# Patient Record
Sex: MALE | Race: OTHER | Employment: FULL TIME | URBAN - METROPOLITAN AREA
[De-identification: names, ages, dates, MRNs, and addresses within clinical notes are randomized per-mention and may not be internally consistent; named-entity substitution may affect disease eponyms.]

---

## 2019-08-15 ENCOUNTER — OFFICE VISIT (OUTPATIENT)
Dept: FAMILY MEDICINE CLINIC | Age: 34
End: 2019-08-15
Payer: COMMERCIAL

## 2019-08-15 VITALS
HEIGHT: 69 IN | BODY MASS INDEX: 24.44 KG/M2 | SYSTOLIC BLOOD PRESSURE: 126 MMHG | WEIGHT: 165 LBS | HEART RATE: 75 BPM | DIASTOLIC BLOOD PRESSURE: 84 MMHG

## 2019-08-15 DIAGNOSIS — Z13.220 SCREENING FOR LIPID DISORDERS: ICD-10-CM

## 2019-08-15 DIAGNOSIS — K59.04 CHRONIC IDIOPATHIC CONSTIPATION: ICD-10-CM

## 2019-08-15 DIAGNOSIS — Z13.21 ENCOUNTER FOR VITAMIN DEFICIENCY SCREENING: ICD-10-CM

## 2019-08-15 DIAGNOSIS — Z13.1 SCREENING FOR DIABETES MELLITUS: ICD-10-CM

## 2019-08-15 DIAGNOSIS — Z76.89 ESTABLISHING CARE WITH NEW DOCTOR, ENCOUNTER FOR: ICD-10-CM

## 2019-08-15 DIAGNOSIS — D12.4 ADENOMATOUS POLYP OF DESCENDING COLON: Primary | ICD-10-CM

## 2019-08-15 DIAGNOSIS — Z13.29 SCREENING FOR ENDOCRINE DISORDER: ICD-10-CM

## 2019-08-15 PROCEDURE — 81003 URINALYSIS AUTO W/O SCOPE: CPT | Performed by: FAMILY MEDICINE

## 2019-08-15 PROCEDURE — 99203 OFFICE O/P NEW LOW 30 MIN: CPT | Performed by: FAMILY MEDICINE

## 2019-08-15 ASSESSMENT — PATIENT HEALTH QUESTIONNAIRE - PHQ9
2. FEELING DOWN, DEPRESSED OR HOPELESS: 0
SUM OF ALL RESPONSES TO PHQ QUESTIONS 1-9: 0
SUM OF ALL RESPONSES TO PHQ9 QUESTIONS 1 & 2: 0
1. LITTLE INTEREST OR PLEASURE IN DOING THINGS: 0
SUM OF ALL RESPONSES TO PHQ QUESTIONS 1-9: 0

## 2019-08-15 ASSESSMENT — ENCOUNTER SYMPTOMS
NAUSEA: 0
COLOR CHANGE: 0
TROUBLE SWALLOWING: 0
EYE PAIN: 0
RESPIRATORY NEGATIVE: 1
APNEA: 0
ABDOMINAL PAIN: 0
VOMITING: 0
DIARRHEA: 0
SHORTNESS OF BREATH: 0
COUGH: 0
SORE THROAT: 0
CHEST TIGHTNESS: 0

## 2019-08-15 NOTE — PROGRESS NOTES
Visit Information    Have you changed or started any medications since your last visit including any over-the-counter medicines, vitamins, or herbal medicines? no   Have you stopped taking any of your medications? Is so, why? -  no  Are you having any side effects from any of your medications? - no    Have you seen any other physician or provider since your last visit?  no   Have you had any other diagnostic tests since your last visit?  no   Have you been seen in the emergency room and/or had an admission in a hospital since we last saw you?  no   Have you had your routine dental cleaning in the past 6 months?  no     Do you have an active MyChart account? If no, what is the barrier?   No:     Patient Care Team:  Marleny Munoz MD as PCP - General    Medical History Review  Past Medical, Family, and Social History reviewed and does not contribute to the patient presenting condition    Health Maintenance   Topic Date Due    Varicella Vaccine (1 of 2 - 13+ 2-dose series) 05/18/2000    HIV screen  05/18/2002    DTaP/Tdap/Td vaccine (1 - Tdap) 05/18/2006    Flu vaccine (1) 09/01/2019    Pneumococcal 0-64 years Vaccine  Aged Out

## 2019-08-16 ASSESSMENT — ENCOUNTER SYMPTOMS: CONSTIPATION: 1

## 2019-12-09 PROBLEM — D12.4 ADENOMATOUS POLYP OF DESCENDING COLON: Status: ACTIVE | Noted: 2019-12-09

## 2019-12-09 PROBLEM — K59.04 CHRONIC IDIOPATHIC CONSTIPATION: Status: ACTIVE | Noted: 2019-12-09

## 2020-10-15 ENCOUNTER — OFFICE VISIT (OUTPATIENT)
Dept: FAMILY MEDICINE CLINIC | Age: 35
End: 2020-10-15
Payer: COMMERCIAL

## 2020-10-15 ENCOUNTER — HOSPITAL ENCOUNTER (OUTPATIENT)
Age: 35
Setting detail: SPECIMEN
Discharge: HOME | End: 2020-10-15
Payer: COMMERCIAL

## 2020-10-15 VITALS
DIASTOLIC BLOOD PRESSURE: 88 MMHG | SYSTOLIC BLOOD PRESSURE: 136 MMHG | OXYGEN SATURATION: 99 % | WEIGHT: 165.2 LBS | TEMPERATURE: 97.9 F | BODY MASS INDEX: 25.93 KG/M2 | HEART RATE: 64 BPM | HEIGHT: 67 IN

## 2020-10-15 PROBLEM — M25.551 HIP PAIN, ACUTE, RIGHT: Status: ACTIVE | Noted: 2020-10-15

## 2020-10-15 LAB
ABSOLUTE EOS #: 0.2 K/UL (ref 0–0.4)
ABSOLUTE IMMATURE GRANULOCYTE: ABNORMAL K/UL (ref 0–0.3)
ABSOLUTE LYMPH #: 2.6 K/UL (ref 1–4.8)
ABSOLUTE MONO #: 0.6 K/UL (ref 0.1–1.3)
ALBUMIN SERPL-MCNC: 4.9 G/DL (ref 3.5–5.2)
ALBUMIN/GLOBULIN RATIO: ABNORMAL (ref 1–2.5)
ALP BLD-CCNC: 134 U/L (ref 40–129)
ALT SERPL-CCNC: 28 U/L (ref 5–41)
ANION GAP SERPL CALCULATED.3IONS-SCNC: 11 MMOL/L (ref 9–17)
AST SERPL-CCNC: 25 U/L
BASOPHILS # BLD: 1 % (ref 0–2)
BASOPHILS ABSOLUTE: 0 K/UL (ref 0–0.2)
BILIRUB SERPL-MCNC: 0.33 MG/DL (ref 0.3–1.2)
BILIRUBIN, POC: NEGATIVE
BLOOD URINE, POC: NEGATIVE
BUN BLDV-MCNC: 11 MG/DL (ref 6–20)
BUN/CREAT BLD: ABNORMAL (ref 9–20)
CALCIUM SERPL-MCNC: 9.8 MG/DL (ref 8.6–10.4)
CHLORIDE BLD-SCNC: 103 MMOL/L (ref 98–107)
CHOLESTEROL, FASTING: 207 MG/DL
CHOLESTEROL/HDL RATIO: 4.7
CLARITY, POC: CLEAR
CO2: 27 MMOL/L (ref 20–31)
COLOR, POC: YELLOW
CREAT SERPL-MCNC: 1.02 MG/DL (ref 0.7–1.2)
DIFFERENTIAL TYPE: ABNORMAL
EOSINOPHILS RELATIVE PERCENT: 2 % (ref 0–4)
ESTIMATED AVERAGE GLUCOSE: 94 MG/DL
GFR AFRICAN AMERICAN: >60 ML/MIN
GFR NON-AFRICAN AMERICAN: >60 ML/MIN
GFR SERPL CREATININE-BSD FRML MDRD: ABNORMAL ML/MIN/{1.73_M2}
GFR SERPL CREATININE-BSD FRML MDRD: ABNORMAL ML/MIN/{1.73_M2}
GLUCOSE FASTING: 106 MG/DL (ref 70–99)
GLUCOSE URINE, POC: NEGATIVE
HBA1C MFR BLD: 4.9 % (ref 4–6)
HCT VFR BLD CALC: 47.3 % (ref 41–53)
HDLC SERPL-MCNC: 44 MG/DL
HEMOGLOBIN: 16.5 G/DL (ref 13.5–17.5)
HIV AG/AB: NONREACTIVE
IMMATURE GRANULOCYTES: ABNORMAL %
KETONES, POC: NEGATIVE
LDL CHOLESTEROL: 131 MG/DL (ref 0–130)
LEUKOCYTE EST, POC: NEGATIVE
LYMPHOCYTES # BLD: 39 % (ref 24–44)
MCH RBC QN AUTO: 31.5 PG (ref 26–34)
MCHC RBC AUTO-ENTMCNC: 34.9 G/DL (ref 31–37)
MCV RBC AUTO: 90.4 FL (ref 80–100)
MONOCYTES # BLD: 10 % (ref 1–7)
NITRITE, POC: NEGATIVE
NRBC AUTOMATED: ABNORMAL PER 100 WBC
PDW BLD-RTO: 13.6 % (ref 11.5–14.9)
PH, POC: 6
PLATELET # BLD: 190 K/UL (ref 150–450)
PLATELET ESTIMATE: ABNORMAL
PMV BLD AUTO: 10.2 FL (ref 6–12)
POTASSIUM SERPL-SCNC: 4.4 MMOL/L (ref 3.7–5.3)
PROTEIN, POC: NEGATIVE
RBC # BLD: 5.24 M/UL (ref 4.5–5.9)
RBC # BLD: ABNORMAL 10*6/UL
SEG NEUTROPHILS: 48 % (ref 36–66)
SEGMENTED NEUTROPHILS ABSOLUTE COUNT: 3.3 K/UL (ref 1.3–9.1)
SODIUM BLD-SCNC: 141 MMOL/L (ref 135–144)
SPECIFIC GRAVITY, POC: 1.03
TOTAL PROTEIN: 8.4 G/DL (ref 6.4–8.3)
TRIGLYCERIDE, FASTING: 162 MG/DL
TSH SERPL DL<=0.05 MIU/L-ACNC: 1.26 MIU/L (ref 0.3–5)
UROBILINOGEN, POC: 0.2
VLDLC SERPL CALC-MCNC: ABNORMAL MG/DL (ref 1–30)
WBC # BLD: 6.7 K/UL (ref 3.5–11)
WBC # BLD: ABNORMAL 10*3/UL

## 2020-10-15 PROCEDURE — 90471 IMMUNIZATION ADMIN: CPT | Performed by: FAMILY MEDICINE

## 2020-10-15 PROCEDURE — 87591 N.GONORRHOEAE DNA AMP PROB: CPT

## 2020-10-15 PROCEDURE — 90715 TDAP VACCINE 7 YRS/> IM: CPT | Performed by: FAMILY MEDICINE

## 2020-10-15 PROCEDURE — 83036 HEMOGLOBIN GLYCOSYLATED A1C: CPT

## 2020-10-15 PROCEDURE — 80061 LIPID PANEL: CPT

## 2020-10-15 PROCEDURE — 81002 URINALYSIS NONAUTO W/O SCOPE: CPT | Performed by: FAMILY MEDICINE

## 2020-10-15 PROCEDURE — 99214 OFFICE O/P EST MOD 30 MIN: CPT | Performed by: FAMILY MEDICINE

## 2020-10-15 PROCEDURE — 87389 HIV-1 AG W/HIV-1&-2 AB AG IA: CPT

## 2020-10-15 PROCEDURE — 84443 ASSAY THYROID STIM HORMONE: CPT

## 2020-10-15 PROCEDURE — 80053 COMPREHEN METABOLIC PANEL: CPT

## 2020-10-15 PROCEDURE — 85025 COMPLETE CBC W/AUTO DIFF WBC: CPT

## 2020-10-15 PROCEDURE — 87491 CHLMYD TRACH DNA AMP PROBE: CPT

## 2020-10-15 PROCEDURE — 81003 URINALYSIS AUTO W/O SCOPE: CPT

## 2020-10-15 PROCEDURE — 81003 URINALYSIS AUTO W/O SCOPE: CPT | Performed by: FAMILY MEDICINE

## 2020-10-15 PROCEDURE — 86787 VARICELLA-ZOSTER ANTIBODY: CPT

## 2020-10-15 PROCEDURE — 36415 COLL VENOUS BLD VENIPUNCTURE: CPT

## 2020-10-15 SDOH — ECONOMIC STABILITY: INCOME INSECURITY: HOW HARD IS IT FOR YOU TO PAY FOR THE VERY BASICS LIKE FOOD, HOUSING, MEDICAL CARE, AND HEATING?: NOT HARD AT ALL

## 2020-10-15 SDOH — ECONOMIC STABILITY: TRANSPORTATION INSECURITY
IN THE PAST 12 MONTHS, HAS LACK OF TRANSPORTATION KEPT YOU FROM MEETINGS, WORK, OR FROM GETTING THINGS NEEDED FOR DAILY LIVING?: NO

## 2020-10-15 SDOH — ECONOMIC STABILITY: FOOD INSECURITY: WITHIN THE PAST 12 MONTHS, YOU WORRIED THAT YOUR FOOD WOULD RUN OUT BEFORE YOU GOT MONEY TO BUY MORE.: NEVER TRUE

## 2020-10-15 SDOH — ECONOMIC STABILITY: TRANSPORTATION INSECURITY
IN THE PAST 12 MONTHS, HAS THE LACK OF TRANSPORTATION KEPT YOU FROM MEDICAL APPOINTMENTS OR FROM GETTING MEDICATIONS?: NO

## 2020-10-15 SDOH — ECONOMIC STABILITY: FOOD INSECURITY: WITHIN THE PAST 12 MONTHS, THE FOOD YOU BOUGHT JUST DIDN'T LAST AND YOU DIDN'T HAVE MONEY TO GET MORE.: NEVER TRUE

## 2020-10-15 ASSESSMENT — PATIENT HEALTH QUESTIONNAIRE - PHQ9
1. LITTLE INTEREST OR PLEASURE IN DOING THINGS: 0
SUM OF ALL RESPONSES TO PHQ QUESTIONS 1-9: 0
2. FEELING DOWN, DEPRESSED OR HOPELESS: 0
SUM OF ALL RESPONSES TO PHQ9 QUESTIONS 1 & 2: 0

## 2020-10-15 ASSESSMENT — ENCOUNTER SYMPTOMS
DIARRHEA: 0
NAUSEA: 0
TROUBLE SWALLOWING: 0
ABDOMINAL PAIN: 0
APNEA: 0
CHEST TIGHTNESS: 0
SORE THROAT: 0
COLOR CHANGE: 0
COUGH: 0
EYE PAIN: 0
RESPIRATORY NEGATIVE: 1
SHORTNESS OF BREATH: 0
VOMITING: 0
CONSTIPATION: 1

## 2020-10-15 NOTE — PROGRESS NOTES
Visit Information    Have you changed or started any medications since your last visit including any over-the-counter medicines, vitamins, or herbal medicines? no   Are you having any side effects from any of your medications? -  no  Have you stopped taking any of your medications? Is so, why? -  no    Have you seen any other physician or provider since your last visit? No  Have you had any other diagnostic tests since your last visit? No  Have you been seen in the emergency room and/or had an admission to a hospital since we last saw you? No  Have you had your routine dental cleaning in the past 6 months? yes -     Have you activated your [employer] account? If not, what are your barriers?  Yes     Patient Care Team:  ANTONIETTA Jimenez CNP as PCP - General (Family Medicine)  ANTONIETTA Carrera CNP as PCP - [facility]    Medical History Review  Past Medical, Family, and Social History reviewed and does contribute to the patient presenting condition    Health Maintenance   Topic Date Due    Varicella vaccine (1 of 2 - 2-dose childhood series) 05/18/1988    HIV screen  05/18/2002    DTaP/Tdap/Td vaccine (1 - Tdap) 05/18/2006    Flu vaccine (1) 09/01/2020    Hepatitis A vaccine  Aged Out    Hepatitis B vaccine  Aged Out    Hib vaccine  Aged Out    Meningococcal (ACWY) vaccine  Aged Out    Pneumococcal 0-64 years Vaccine  Aged [place]

## 2020-10-15 NOTE — PATIENT INSTRUCTIONS
Patient Education        Arthritis: Care Instructions  Overview  Arthritis, also called osteoarthritis, is a breakdown of the cartilage that cushions your joints. When the cartilage wears down, your bones rub against each other. This causes pain and stiffness. Many people have some arthritis as they age. Arthritis most often affects the joints of the spine, hands, hips, knees, or feet. Arthritis never goes away completely. But medicine and home treatment can help reduce pain and help you stay active. Follow-up care is a key part of your treatment and safety. Be sure to make and go to all appointments, and call your doctor if you are having problems. It's also a good idea to know your test results and keep a list of the medicines you take. How can you care for yourself at home? · Stay at a healthy weight. Being overweight puts extra strain on your joints. · Talk to your doctor or physical therapist about exercises that will help ease joint pain. ? Stretch. You may enjoy gentle forms of yoga to help keep your joints and muscles flexible. ? Walk instead of jog. Other types of exercise that are less stressful on the joints include riding a bike, swimming, james chi, or water exercise. ? Lift weights. Strong muscles help reduce stress on your joints. Stronger thigh muscles, for example, take some of the stress off of the knees and hips. Learn the right way to lift weights so you don't make joint pain worse. · Take your medicines exactly as prescribed. Call your doctor if you think you are having a problem with your medicine. · Take pain medicines exactly as directed. ? If the doctor gave you a prescription medicine for pain, take it as prescribed. ? If you are not taking a prescription pain medicine, ask your doctor if you can take an over-the-counter medicine. · Use a cane, crutch, walker, or another device if you need help to get around. These can help rest your joints.  You also can use other things to make should know about polyethylene glycol 3350? You should not use this medicine if you have a bowel obstruction or intestinal blockage. If you have any of these conditions, you could have dangerous or life-threatening side effects from polyethylene glycol 3350. Do not use polyethylene glycol 3350 more than once per day. Call your doctor if you are still constipated or irregular after using this medication for 7 days in a row. What is polyethylene glycol 3350? Polyethylene glycol 3350 is a laxative solution that increases the amount of water in the intestinal tract to stimulate bowel movements. Polyethylene glycol 3350 is used as a laxative to treat occasional constipation or irregular bowel movements. Polyethylene glycol 3350 may also be used for purposes not listed in this medication guide. What should I discuss with my healthcare provider before taking polyethylene glycol 3350? You should not use this medicine if you are allergic to polyethylene glycol, or if you have a bowel obstruction or intestinal blockage. If you have any of these conditions, you could have dangerous or life-threatening side effects from polyethylene glycol 3350. People with eating disorders (such as anorexia or bulimia) should not use this medication without the advice of a doctor. To make sure this medicine is safe for you, tell your doctor if you have:  · nausea, vomiting, or severe stomach pain;  · ulcerative colitis;  · irritable bowel syndrome;  · kidney disease; or  · if you have had a sudden change in bowel habits that has lasted 2 weeks or longer. FDA pregnancy category C. It is not known whether polyethylene glycol 3350 will harm an unborn baby. Tell your doctor if you are pregnant or plan to become pregnant while using this medication. It is not known whether polyethylene glycol 3350 passes into breast milk or if it could harm a nursing baby. Tell your doctor if you are breast-feeding a baby.   How should I take medical advice about side effects. You may report side effects to FDA at 7-068-RTO-9403. What other drugs will affect polyethylene glycol 3350? Other drugs may interact with polyethylene glycol 3350, including prescription and over-the-counter medicines, vitamins, and herbal products. Tell each of your health care providers about all medicines you use now and any medicine you start or stop using. Where can I get more information? Your pharmacist can provide more information about polyethylene glycol 3350. Remember, keep this and all other medicines out of the reach of children, never share your medicines with others, and use this medication only for the indication prescribed. Every effort has been made to ensure that the information provided by [facility] [address] is accurate, up-to-date, and complete, but no guarantee is made to that effect. Drug information contained herein may be time sensitive. [employer] information has been compiled for use by healthcare practitioners and consumers in the [place] and therefore [employer] does not warrant that uses outside of the [place] are appropriate, unless specifically indicated otherwise. [employer] drug information does not endorse drugs, diagnose patients or recommend therapy. [employer] drug information is an informational resource designed to assist licensed healthcare practitioners in caring for their patients and/or to serve consumers viewing this service as a supplement to, and not a substitute for, the expertise, skill, knowledge and judgment of healthcare practitioners. The absence of a warning for a given drug or drug combination in no way should be construed to indicate that the drug or drug combination is safe, effective or appropriate for any given patient. [employer] does not assume any responsibility for any aspect of healthcare administered with the aid of information [employer] provides.  The information contained herein is not intended to cover all possible uses, directions, precautions, warnings, drug interactions, allergic reactions, or adverse effects. If you have questions about the drugs you are taking, check with your doctor, nurse or pharmacist.  Copyright 6853-0632 [address]. Version: 2.04. Revision date: 4/5/2017. Care instructions adapted under license by [facility] ([facility]). If you have questions about a medical condition or this instruction, always ask your healthcare professional. [place] [address] any warranty or liability for your use of this information.

## 2020-10-15 NOTE — PROGRESS NOTES
[facility]  [facility]  [address]  [address] [facility] [address]  Dept: [phone]     10/15/2020   Chief Complaint   Patient presents with    Urinary Frequency    Other     wants full check for everything     Hip Pain     HPI  Silver Morris (:  1985) is a 37 y.o. male is an established patient. Patient has a history of idiopathic constipation, hip pain, and c/o urinary frequency. Patient c/o a day urinary frequency, no dysuria, no abdominal pain, no fever chills. Urine dip in the office normal. Denies any unprotected sex with other women other than his wife. But would like to be checked for STI. No penile discharge. He also has a history of idiopathic constipation for several years. He even had a colonoscopy two years ago. He was found with some polyps. Biopsy was normal. Results below. Patient continues to have intermittent constipation. He was instructed to take supplemental fiber daily with increased fluid intake. Patient continue to have intermittent problems. I will start natural fiber and miralax. Patient did not start on fiber written from last visit. Silver Morris  is due for Tetanus Diphtheria vaccination. Patient's last dose of TD was unknown.  he denies any previous adverse reaction to this type of vaccine. Bogdan Zambrano is due for Influenza vaccine. Denies side effects from prior flu shots. Denies allergy to eggs. Denies history of Guillain-Barré syndrome.      Source of Specimen   1: RECTAL-SIGMOID POLYP     Gross Description   \"RECTAL-SIGMOID POLYP\" Received in formalin, submitted and processed   at [facility], [address] [facility] [address] is 1 reddish   brown fragment measuring 1.5 x 0.9 x 0.8 cm in greatest dimension. Patient also c/o some right hip pain. He denies any trauma. Pain is achy and sometimes sharp, worse at nigth some pain radiation to anterior thigh area. No numbness. Oh Webb    PHQ-9 Total Score: 0 (10/15/2020 8:20 AM)     Counseling given: Yes    Patient Active Problem List   Diagnosis    Chronic idiopathic constipation    Adenomatous polyp of descending colon    BMI 24.0-24.9, adult    Hip pain, acute, right      History reviewed. No pertinent past medical history. History reviewed. No pertinent surgical history. History reviewed. No pertinent family history. No current outpatient medications on file. No current facility-administered medications for this visit. Review of Systems   Constitutional: Negative. Negative for chills, fatigue and fever. HENT: Negative for congestion, ear pain, sore throat and trouble swallowing. Eyes: Negative for pain and visual disturbance. Respiratory: Negative. Negative for apnea, cough, chest tightness and shortness of breath. Cardiovascular: Negative. Gastrointestinal: Positive for constipation (occasional). Negative for abdominal pain, diarrhea, nausea and vomiting. Endocrine: Negative for cold intolerance and heat intolerance. Genitourinary: Positive for frequency and urgency. Negative for difficulty urinating, dysuria and genital sores. Musculoskeletal: Negative for arthralgias, gait problem and myalgias. Skin: Negative for color change and rash. Neurological: Negative for weakness, light-headedness and headaches. Psychiatric/Behavioral: Negative for agitation, behavioral problems, decreased concentration and sleep disturbance. The patient is nervous/anxious.        Prior to Visit Medications    Not on File      Social History     Tobacco Use    Smoking status: Never Smoker    Smokeless tobacco: Never Used   Substance Use Topics    Alcohol use: Yes      Vitals:    10/15/20 0819   BP: 136/88   Pulse: 64   Temp: 97.9 °F (36.6 °C)   TempSrc: Temporal   SpO2: 99%   Weight: 165 lb 3.2 oz (74.9 kg)   Height: 5' 7\" (1.702 m)     Estimated body mass index is 25.87 kg/m² as calculated from the following:    Height as of this encounter: 5' 7\" (1.702 m).    Weight as of this encounter: 165 lb 3.2 oz (74.9 kg). Physical Exam  Vitals signs and nursing note reviewed. Constitutional:       Appearance: He is well-developed. HENT:      Head: Normocephalic. Right Ear: External ear normal.      Left Ear: External ear normal.      Nose: Nose normal.   Eyes:      General: No scleral icterus. Conjunctiva/sclera: Conjunctivae normal.      Pupils: Pupils are equal, round, and reactive to light. Neck:      Musculoskeletal: Normal range of motion and neck supple. Thyroid: No thyromegaly. Vascular: No JVD. Cardiovascular:      Rate and Rhythm: Normal rate and regular rhythm. Heart sounds: Normal heart sounds. No murmur. No friction rub. No gallop. Pulmonary:      Effort: Pulmonary effort is normal. No respiratory distress. Breath sounds: Normal breath sounds. No wheezing or rales. Abdominal:      General: Bowel sounds are normal. There is no distension. Palpations: Abdomen is soft. Tenderness: There is no abdominal tenderness. There is no guarding or rebound. Hernia: No hernia is present. Genitourinary:     Comments: Not examined  Musculoskeletal: Normal range of motion. General: No tenderness. Lymphadenopathy:      Cervical: No cervical adenopathy. Skin:     General: Skin is warm and dry. Capillary Refill: Capillary refill takes less than 2 seconds. Findings: No rash. Neurological:      Mental Status: He is alert and oriented to person, place, and time. Sensory: No sensory deficit. Coordination: Coordination normal.   Psychiatric:         Mood and Affect: Mood is anxious. Behavior: Behavior normal.         Thought Content: Thought content normal.         Judgment: Judgment normal.       Most recent labs reviewed with patient, and all questions answered.   Lab Results   Component Value Date    WBC 6.7 10/15/2020    HGB 16.5 10/15/2020    HCT 47.3 10/15/2020    MCV 90.4 10/15/2020     10/15/2020     Lab Results   Component Value Date     10/15/2020    K 4.4 10/15/2020     10/15/2020    CO2 27 10/15/2020    BUN 11 10/15/2020    CREATININE 1.02 10/15/2020    GLUCOSE 105 11/06/2014    CALCIUM 9.8 10/15/2020      Lab Results   Component Value Date    ALT 28 10/15/2020    AST 25 10/15/2020    ALKPHOS 134 (H) 10/15/2020    BILITOT 0.33 10/15/2020     Lab Results   Component Value Date    TSH 1.26 10/15/2020     Lab Results   Component Value Date    CHOL 164 11/06/2014     Lab Results   Component Value Date    TRIG 96 11/06/2014     Lab Results   Component Value Date    HDL 44 10/15/2020    HDL 48 11/06/2014     Lab Results   Component Value Date    LDLCHOLESTEROL 131 (H) 10/15/2020    LDLCHOLESTEROL 97 11/06/2014     Lab Results   Component Value Date    CHOLHDLRATIO 4.7 10/15/2020    CHOLHDLRATIO 3.4 11/06/2014     ASSESSMENT/PLAN:  1. Chronic idiopathic constipation  Failure to Improve  Continue therapy. DISCUSSED and ADVISED TO:  Drink plenty of fluids, enough so that your urine is light yellow or clear like water. Include high-fiber foods in your diet each day. These include fruits, vegetables, beans, and whole grains. Get at least 30 minutes of exercise on most days of the week. Take a fiber supplement, such as Citrucel (Methylcellulose fiber) or Metamucil (Psyllium), every day. Schedule time each day for a bowel movement. 2. Hip pain, acute, right  Failure to Improve  Acute onsent  Non traumatic  XR  Stretches    - XR HIP 2-3 VW W PELVIS RIGHT; Future    3. Frequency of urination  Failure to Improve  No UTI  Likely due to constipation  - POCT Urinalysis no Micro  - C.trachomatis N.gonorrhoeae DNA, Urine; Future  - C.trachomatis N.gonorrhoeae DNA, Urine    4. Need for Tdap vaccination  Recommended by CDC. No current infection. Denies previous adverse reaction to vaccination.     5. Screening for viral disease  recommended    - Varicella Zoster Antibody, IgG; Future  - HIV Screen; Future    6. Influenza vaccination declined  Declined, despite lengthy discussion of the importance of getting vaccinated. Controlled Substance Monitoring:  Acute and Chronic Pain Monitoring:   No flowsheet data found. Orders Placed This Encounter   Procedures    C.trachomatis N.gonorrhoeae DNA, Urine     URINE     Standing Status:   Future     Number of Occurrences:   1     Standing Expiration Date:   4/15/2022    XR HIP 2-3 VW W PELVIS RIGHT     Standing Status:   Future     Standing Expiration Date:   10/15/2021     Order Specific Question:   Reason for exam:     Answer:   hip pain    Tdap (age 6y and older) IM (Boostrix)    Varicella Zoster Antibody, IgG     Standing Status:   Future     Number of Occurrences:   1     Standing Expiration Date:   4/15/2022    HIV Screen     Standing Status:   Future     Number of Occurrences:   1     Standing Expiration Date:   4/15/2022    POCT Urinalysis no Micro     No orders of the defined types were placed in this encounter. There are no discontinued medications. Health Maintenance Due   Topic Date Due    Varicella vaccine (1 of 2 - 2-dose childhood series) 05/18/1988    HIV screen  05/18/2002      Return in about 3 months (around 1/15/2021) for Chronic conditions, Rev. results. Farzad Poag received counseling on the following healthy behaviors: nutrition, exercise and medication adherence  Reviewed prior labs and health maintenance  Continue current medications, diet and exercise. Discussed use, benefit, and side effects of prescribed medications. Barriers to medication compliance addressed. Patient given educational materials - see patient instructions  Was a self-tracking handout given in paper form or via [employer]? Yes    Requested Prescriptions      No prescriptions requested or ordered in this encounter       All patient questions answered. Patient voiced understanding.     Quality Measures    Body mass index is 25.87 kg/m². Elevated. Weight control planned discussed Healthy diet and regular exercise. BP: 136/88 Blood pressure is normal. Treatment plan consists of No treatment change needed. Lab Results   Component Value Date    LDLCHOLESTEROL 131 (H) 10/15/2020    (goal LDL reduction with dx if diabetes is 50% LDL reduction)      PHQ Scores 10/15/2020 8/15/2019   PHQ2 Score 0 0   PHQ9 Score 0 0     Interpretation of Total Score Depression Severity: 1-4 = Minimal depression, 5-9 = Mild depression, 10-14 = Moderate depression, 15-19 = Moderately severe depression, 20-27 = Severe depression   This note was completed by using the assistance of a speech-recognition program. However, inadvertent computerized transcription errors may be present. Although every effort was made to ensure accuracy, no guarantees can be provided that every mistake has been identified and corrected by editing   An electronic signature was used to authenticate this note.   --ANTONIETTA Dent - CNP on 10/15/2020 at 7:37 PM

## 2020-10-16 LAB
BILIRUBIN URINE: NEGATIVE
COLOR: YELLOW
COMMENT UA: NORMAL
GLUCOSE URINE: NEGATIVE
KETONES, URINE: NEGATIVE
LEUKOCYTE ESTERASE, URINE: NEGATIVE
NITRITE, URINE: NEGATIVE
PH UA: 5.5 (ref 5–8)
PROTEIN UA: NEGATIVE
SPECIFIC GRAVITY UA: 1.03 (ref 1–1.03)
TURBIDITY: CLEAR
URINE HGB: NEGATIVE
UROBILINOGEN, URINE: NORMAL
VZV IGG SER QL IA: 1.74

## 2020-10-19 ENCOUNTER — TELEPHONE (OUTPATIENT)
Dept: FAMILY MEDICINE CLINIC | Age: 35
End: 2020-10-19

## 2020-10-19 LAB
C. TRACHOMATIS DNA ,URINE: NEGATIVE
N. GONORRHOEAE DNA, URINE: NEGATIVE
SPECIMEN DESCRIPTION: NORMAL

## 2020-10-19 RX ORDER — PRAVASTATIN SODIUM 20 MG
20 TABLET ORAL DAILY
Qty: 90 TABLET | Refills: 1 | Status: SHIPPED | OUTPATIENT
Start: 2020-10-19

## 2020-10-19 NOTE — TELEPHONE ENCOUNTER
Patient know that his cholesterol levels were elevated. I will start him on some pravastatin for this. Please advise the patient to:  Cut down on red meats and trans fats in their diet. Increase physical activity. Add some regular exercise at least 3 times a week on their routine  Try to lose weight to help improve their cholesterol levels. He does not have any sexually-transmitted infection her urinalysis was also normal    His blood count was also normal.     One of his liver enzymes elevated. Please remind him to cut down on alcohol intake and Tylenol intake. His diabetes screening, thyroid function, HIV screening, was normal.    His varicella titer was also normal.    We can discuss the rest of the result further on his  next visit. Thanks.

## 2021-01-14 PROBLEM — E78.2 MIXED HYPERLIPIDEMIA: Status: ACTIVE | Noted: 2021-01-14

## 2021-01-14 PROBLEM — M16.10 ARTHRITIS PAIN, HIP: Status: ACTIVE | Noted: 2020-10-15

## 2022-12-06 ENCOUNTER — OFFICE VISIT (OUTPATIENT)
Dept: FAMILY MEDICINE CLINIC | Age: 37
End: 2022-12-06
Payer: COMMERCIAL

## 2022-12-06 VITALS
DIASTOLIC BLOOD PRESSURE: 80 MMHG | SYSTOLIC BLOOD PRESSURE: 114 MMHG | OXYGEN SATURATION: 97 % | WEIGHT: 176.6 LBS | HEART RATE: 75 BPM | BODY MASS INDEX: 26.76 KG/M2 | HEIGHT: 68 IN | TEMPERATURE: 97.5 F

## 2022-12-06 DIAGNOSIS — Z13.1 SCREENING FOR DIABETES MELLITUS (DM): ICD-10-CM

## 2022-12-06 DIAGNOSIS — R06.89 DIFFICULTY BREATHING: ICD-10-CM

## 2022-12-06 DIAGNOSIS — Z11.59 ENCOUNTER FOR SCREENING FOR OTHER VIRAL DISEASES: ICD-10-CM

## 2022-12-06 DIAGNOSIS — R22.1 SENSATION OF LUMP IN THROAT: Primary | ICD-10-CM

## 2022-12-06 DIAGNOSIS — Z23 ENCOUNTER FOR IMMUNIZATION: ICD-10-CM

## 2022-12-06 DIAGNOSIS — E78.2 MIXED HYPERLIPIDEMIA: ICD-10-CM

## 2022-12-06 DIAGNOSIS — R06.83 SNORING: ICD-10-CM

## 2022-12-06 PROBLEM — R09.A2 SENSATION OF LUMP IN THROAT: Status: ACTIVE | Noted: 2022-12-06

## 2022-12-06 PROCEDURE — 99214 OFFICE O/P EST MOD 30 MIN: CPT | Performed by: FAMILY MEDICINE

## 2022-12-06 SDOH — ECONOMIC STABILITY: FOOD INSECURITY: WITHIN THE PAST 12 MONTHS, THE FOOD YOU BOUGHT JUST DIDN'T LAST AND YOU DIDN'T HAVE MONEY TO GET MORE.: NEVER TRUE

## 2022-12-06 SDOH — ECONOMIC STABILITY: FOOD INSECURITY: WITHIN THE PAST 12 MONTHS, YOU WORRIED THAT YOUR FOOD WOULD RUN OUT BEFORE YOU GOT MONEY TO BUY MORE.: NEVER TRUE

## 2022-12-06 ASSESSMENT — ENCOUNTER SYMPTOMS
ABDOMINAL DISTENTION: 0
NAUSEA: 0
COUGH: 0
TROUBLE SWALLOWING: 1
VOICE CHANGE: 0
ABDOMINAL PAIN: 0
WHEEZING: 0
VOMITING: 0
EYE REDNESS: 0
BLOOD IN STOOL: 0
DIARRHEA: 0
BACK PAIN: 0
SHORTNESS OF BREATH: 0
SORE THROAT: 0
STRIDOR: 0
CHEST TIGHTNESS: 0
RECTAL PAIN: 0
RHINORRHEA: 0
COLOR CHANGE: 0
CONSTIPATION: 0
SINUS PRESSURE: 0

## 2022-12-06 ASSESSMENT — PATIENT HEALTH QUESTIONNAIRE - PHQ9
SUM OF ALL RESPONSES TO PHQ QUESTIONS 1-9: 0
2. FEELING DOWN, DEPRESSED OR HOPELESS: 0
SUM OF ALL RESPONSES TO PHQ QUESTIONS 1-9: 0
1. LITTLE INTEREST OR PLEASURE IN DOING THINGS: 0
SUM OF ALL RESPONSES TO PHQ9 QUESTIONS 1 & 2: 0

## 2022-12-06 ASSESSMENT — SOCIAL DETERMINANTS OF HEALTH (SDOH): HOW HARD IS IT FOR YOU TO PAY FOR THE VERY BASICS LIKE FOOD, HOUSING, MEDICAL CARE, AND HEATING?: NOT HARD AT ALL

## 2022-12-06 NOTE — PROGRESS NOTES
Chief Complaint   Patient presents with    Mass     THROAT/NO TREATMENT /NO PAIN    Immunizations     NO TO FLU VACCINE         Enio Vance  here today for follow up on chronic medical problems, go over labs and/or diagnostic studies, and medication refills. Mass (THROAT/NO TREATMENT /NO PAIN) and Immunizations (NO TO FLU VACCINE)      HPI: Patient is sick call, complaining of sensation of lump in his throat. Patient reports he has noticed that since last 2 months that he feels lump in his throat. He can see it. Patient denies any difficulty in swallowing. Patient complains of sensation during night, reports he chokes during night. He denies  Any nausea vomiting any weight loss, patient denies any hematemesis or difficulty in eating. Patient denies any hoarseness. Patient denies any drinking except socially, denies any smoking history denies any illicit drug use or any marijuana. Patient denies any family history of cancers. Patient does complain of snoring, choking spells during night never had any sleep study done. He tosses around. Patient denies any apneic spells any headaches. Hyperlipidemia no recent blood work was taking pravastatin patient has stopped taking that. /80   Pulse 75   Temp 97.5 °F (36.4 °C)   Ht 5' 8\" (1.727 m)   Wt 176 lb 9.6 oz (80.1 kg)   SpO2 97%   BMI 26.85 kg/m²    Body mass index is 26.85 kg/m². Wt Readings from Last 3 Encounters:   12/06/22 176 lb 9.6 oz (80.1 kg)   10/15/20 165 lb 3.2 oz (74.9 kg)   08/15/19 165 lb (74.8 kg)        []Negative depression screening. PHQ Scores 12/6/2022 10/15/2020 8/15/2019   PHQ2 Score 0 0 0   PHQ9 Score 0 0 0      []1-4 = Minimal depression   []5-9 = Milddepression   []10-14 = Moderate depression   []15-19 = Moderately severe depression   []20-27 = Severe depression    Discussed testing with the patient and all questions fully answered.     Hospital Outpatient Visit on 10/15/2020   Component Date Value Ref Range Status    Varicella Zoster Ab IgG 10/15/2020 1.74  >1.09 Final    Comment:    Interpretation:  IMMUNE     Reference Range:  <0.91         Not Immune  0.91-1.09     Equivocal  >1.09         Immune         HIV Ag/Ab 10/15/2020 NONREACTIVE  NONREACTIVE Final    Comment: No laboratory evidence of HIV infection. If acute HIV infection is suspected, consider   testing for HIV-1 RNA. Cholesterol, Fasting 10/15/2020 207 (A)  <200 mg/dL Final    Comment:    Cholesterol Guidelines:      <200  Desirable   200-240  Borderline      >240  Undesirable         HDL 10/15/2020 44  >40 mg/dL Final    Comment:    HDL Guidelines:    <40     Undesirable   40-59    Borderline    >59     Desirable         LDL Cholesterol 10/15/2020 131 (A)  0 - 130 mg/dL Final    Comment:    LDL Guidelines:     <100    Desirable   100-129   Near to/above Desirable   130-159   Borderline      >159   Undesirable     Direct (measured) LDL and calculated LDL are not interchangeable tests. Chol/HDL Ratio 10/15/2020 4.7  <5 Final            Triglyceride, Fasting 10/15/2020 162 (A)  <150 mg/dL Final    Comment:    Triglyceride Guidelines:     <150   Desirable   150-199  Borderline   200-499  High     >499   Very high   Based on AHA Guidelines for fasting triglyceride, October 2012. VLDL 10/15/2020 NOT REPORTED (A)  1 - 30 mg/dL Final    TSH 10/15/2020 1.26  0.30 - 5.00 mIU/L Final    Hemoglobin A1C 10/15/2020 4.9  4.0 - 6.0 % Final    Estimated Avg Glucose 10/15/2020 94  mg/dL Final    Comment: The ADA and AACC recommend providing the estimated average glucose result to permit better   patient understanding of their HBA1c result.       Glucose, Fasting 10/15/2020 106 (A)  70 - 99 mg/dL Final    BUN 10/15/2020 11  6 - 20 mg/dL Final    Creatinine 10/15/2020 1.02  0.70 - 1.20 mg/dL Final    Bun/Cre Ratio 10/15/2020 NOT REPORTED  9 - 20 Final    Calcium 10/15/2020 9.8  8.6 - 10.4 mg/dL Final    Sodium 10/15/2020 141  135 - 144 mmol/L Final Potassium 10/15/2020 4.4  3.7 - 5.3 mmol/L Final    Chloride 10/15/2020 103  98 - 107 mmol/L Final    CO2 10/15/2020 27  20 - 31 mmol/L Final    Anion Gap 10/15/2020 11  9 - 17 mmol/L Final    Alkaline Phosphatase 10/15/2020 134 (A)  40 - 129 U/L Final    ALT 10/15/2020 28  5 - 41 U/L Final    AST 10/15/2020 25  <40 U/L Final    Total Bilirubin 10/15/2020 0.33  0.3 - 1.2 mg/dL Final    Total Protein 10/15/2020 8.4 (A)  6.4 - 8.3 g/dL Final    Albumin 10/15/2020 4.9  3.5 - 5.2 g/dL Final    Albumin/Globulin Ratio 10/15/2020 NOT REPORTED  1.0 - 2.5 Final    GFR Non- 10/15/2020 >60  >60 mL/min Final    GFR  10/15/2020 >60  >60 mL/min Final    GFR Comment 10/15/2020        Final    Comment: Average GFR for 30-36 years old:   107 mL/min/1.73sq m  Chronic Kidney Disease:   <60 mL/min/1.73sq m  Kidney failure:   <15 mL/min/1.73sq m              eGFR calculated using average adult body mass.  Additional eGFR calculator available at:        [employer]            GFR Staging 10/15/2020 NOT REPORTED   Final    WBC 10/15/2020 6.7  3.5 - 11.0 k/uL Final    RBC 10/15/2020 5.24  4.5 - 5.9 m/uL Final    Hemoglobin 10/15/2020 16.5  13.5 - 17.5 g/dL Final    Hematocrit 10/15/2020 47.3  41 - 53 % Final    MCV 10/15/2020 90.4  80 - 100 fL Final    MCH 10/15/2020 31.5  26 - 34 pg Final    MCHC 10/15/2020 34.9  31 - 37 g/dL Final    RDW 10/15/2020 13.6  11.5 - 14.9 % Final    Platelets 47/83/2523 190  150 - 450 k/uL Final    MPV 10/15/2020 10.2  6.0 - 12.0 fL Final    NRBC Automated 10/15/2020 NOT REPORTED  per 100 WBC Final    Differential Type 10/15/2020 NOT REPORTED   Final    Seg Neutrophils 10/15/2020 48  36 - 66 % Final    Lymphocytes 10/15/2020 39  24 - 44 % Final    Monocytes 10/15/2020 10 (A)  1 - 7 % Final    Eosinophils % 10/15/2020 2  0 - 4 % Final    Basophils 10/15/2020 1  0 - 2 % Final    Immature Granulocytes 10/15/2020 NOT REPORTED  0 % Final    Segs Absolute 10/15/2020 3.30  1.3 - 9.1 k/uL Final    Absolute Lymph # 10/15/2020 2.60  1.0 - 4.8 k/uL Final    Absolute Mono # 10/15/2020 0.60  0.1 - 1.3 k/uL Final    Absolute Eos # 10/15/2020 0.20  0.0 - 0.4 k/uL Final    Basophils Absolute 10/15/2020 0.00  0.0 - 0.2 k/uL Final    Absolute Immature Granulocyte 10/15/2020 NOT REPORTED  0.00 - 0.30 k/uL Final    WBC Morphology 10/15/2020 NOT REPORTED   Final    RBC Morphology 10/15/2020 NOT REPORTED   Final    Platelet Estimate 50/34/0816 NOT REPORTED   Final    Color, UA 10/15/2020 YELLOW  YELLOW Final    Turbidity UA 10/15/2020 CLEAR  CLEAR Final    Glucose, Ur 10/15/2020 NEGATIVE  NEGATIVE Final    Bilirubin Urine 10/15/2020 NEGATIVE  NEGATIVE Final    Ketones, Urine 10/15/2020 NEGATIVE  NEGATIVE Final    Specific Gravity, UA 10/15/2020 1.028  1.005 - 1.030 Final    Urine Hgb 10/15/2020 NEGATIVE  NEGATIVE Final    pH, UA 10/15/2020 5.5  5.0 - 8.0 Final    Protein, UA 10/15/2020 NEGATIVE  NEGATIVE Final    Urobilinogen, Urine 10/15/2020 Normal  Normal Final    Nitrite, Urine 10/15/2020 NEGATIVE  NEGATIVE Final    Leukocyte Esterase, Urine 10/15/2020 NEGATIVE  NEGATIVE Final    Urinalysis Comments 10/15/2020 Microscopic exam not performed based on chemical results unless requested in original order. Final    Specimen Description 10/15/2020 . URINE   Final    C. trachomatis DNA ,Urine 10/15/2020 NEGATIVE  NEGATIVE Final    Comment: CHLAMYDIA TRACHOMATIS DNA not detected by nucleic acid amplification. This test is intended for medical purposes only and is not valid for the evaluation of   suspected sexual abuse or for other forensic purposes. In certain contexts, culture may be required to meet applicable laws and regulations for   diagnosis of C. trachomatis and N. gonorrhoeae infections. Per 2014  CDC recommendations, this test does not include confirmation of positive results   by an alternative nucleic acid target.       N. gonorrhoeae DNA, Urine 10/15/2020 NEGATIVE  NEGATIVE Final    Comment: NEISSERIA GONORRHOEAE DNA not detected by nucleic acid amplification. This test is intended for medical purposes only and is not valid for the evaluation of   suspected sexual abuse or for other forensic purposes. In certain contexts, culture may be required to meet applicable laws and regulations for   diagnosis of C. trachomatis and N. gonorrhoeae infections. Per 2014  CDC recommendations, this test does not include confirmation of positive results   by an alternative nucleic acid target. Most recent labs reviewed:     Lab Results   Component Value Date    WBC 6.7 10/15/2020    HGB 16.5 10/15/2020    HCT 47.3 10/15/2020    MCV 90.4 10/15/2020     10/15/2020       @BRIEFLAB(NA,K,CL,CO2,BUN,CREATININE,GLUCOSE,CALCIUM)@     Lab Results   Component Value Date    ALT 28 10/15/2020    AST 25 10/15/2020    ALKPHOS 134 (H) 10/15/2020    BILITOT 0.33 10/15/2020       Lab Results   Component Value Date    TSH 1.26 10/15/2020       Lab Results   Component Value Date    CHOL 164 11/06/2014     Lab Results   Component Value Date    TRIG 96 11/06/2014     Lab Results   Component Value Date    HDL 44 10/15/2020    HDL 48 11/06/2014     Lab Results   Component Value Date    LDLCHOLESTEROL 131 (H) 10/15/2020    LDLCHOLESTEROL 97 11/06/2014     Lab Results   Component Value Date    VLDL NOT REPORTED (H) 10/15/2020    VLDL NOT REPORTED 11/06/2014     Lab Results   Component Value Date    CHOLHDLRATIO 4.7 10/15/2020    CHOLHDLRATIO 3.4 11/06/2014       Lab Results   Component Value Date    LABA1C 4.9 10/15/2020       No results found for: ODTMFQSY87    No results found for: FOLATE    No results found for: IRON, TIBC, FERRITIN    No results found for: VITD25          No current outpatient medications on file. No current facility-administered medications for this visit.              Social History     Socioeconomic History    Marital status: Single     Spouse name: Not on file    Number of children: Not on file    Years of education: Not on file    Highest education level: Not on file   Occupational History    Not on file   Tobacco Use    Smoking status: Never    Smokeless tobacco: Never   Substance and Sexual Activity    Alcohol use: Yes    Drug use: Never    Sexual activity: Yes   Other Topics Concern    Not on file   Social History Narrative    Not on file     Social Determinants of Health     Financial Resource Strain: Low Risk     Difficulty of Paying Living Expenses: Not hard at all   Food Insecurity: No Food Insecurity    Worried About Running Out of Food in the Last Year: Never true    Ran Out of Food in the Last Year: Never true   Transportation Needs: Not on file   Physical Activity: Not on file   Stress: Not on file   Social Connections: Not on file   Intimate Partner Violence: Not on file   Housing Stability: Not on file     Counseling given: Not Answered        History reviewed. No pertinent family history.          -rest of complaints with corresponding details per ROS    The patient's past medical, surgical, social, and family history as well as his current medications and allergies were reviewed as documented intoday's encounter. Review of Systems   Constitutional:  Negative for activity change, appetite change, fatigue, fever and unexpected weight change. HENT:  Positive for trouble swallowing. Negative for congestion, drooling, ear pain, hearing loss, postnasal drip, rhinorrhea, sinus pressure, sore throat and voice change. Eyes:  Negative for redness and visual disturbance. Respiratory:  Negative for cough, chest tightness, shortness of breath, wheezing and stridor. Cardiovascular:  Negative for chest pain, palpitations and leg swelling. Gastrointestinal:  Negative for abdominal distention, abdominal pain, blood in stool, constipation, diarrhea, nausea, rectal pain and vomiting. Endocrine: Negative for polydipsia, polyphagia and polyuria. Genitourinary:  Negative for difficulty urinating, flank pain, frequency and urgency. Musculoskeletal:  Negative for arthralgias, back pain, gait problem, myalgias and neck pain. Skin:  Negative for color change, rash and wound. Allergic/Immunologic: Negative for food allergies and immunocompromised state. Neurological:  Negative for dizziness, speech difficulty, weakness, light-headedness, numbness and headaches. Psychiatric/Behavioral:  Negative for agitation, behavioral problems, decreased concentration, dysphoric mood, hallucinations, sleep disturbance and suicidal ideas. The patient is nervous/anxious. Physical Exam  Vitals and nursing note reviewed. Constitutional:       General: He is not in acute distress. Appearance: Normal appearance. He is well-developed. He is obese. He is not diaphoretic. HENT:      Head: Normocephalic and atraumatic. Right Ear: Tympanic membrane normal.      Left Ear: Tympanic membrane normal.      Nose: Nose normal. No congestion or rhinorrhea. Mouth/Throat:      Lips: Pink. Mouth: Mucous membranes are moist. Oral lesions present. No injury or angioedema. Tongue: Lesions present. Tongue does not deviate from midline. Palate: No mass and lesions. Pharynx: Posterior oropharyngeal erythema present. No pharyngeal swelling. Comments: Patient is pointing towards base of tongue and has small tissue attached, pharyngeal mucosa is erythematous and hypertrophied. Eyes:      General:         Right eye: No discharge. Left eye: No discharge. Extraocular Movements: Extraocular movements intact. Conjunctiva/sclera: Conjunctivae normal.      Pupils: Pupils are equal, round, and reactive to light. Neck:      Thyroid: No thyromegaly. Cardiovascular:      Rate and Rhythm: Normal rate and regular rhythm. Heart sounds: Normal heart sounds. No murmur heard.   Pulmonary:      Effort: Pulmonary effort is normal. No respiratory distress. Breath sounds: Normal breath sounds. No wheezing or rhonchi. Abdominal:      General: Bowel sounds are normal. There is no distension. Palpations: Abdomen is soft. There is no mass. Tenderness: There is no abdominal tenderness. There is no right CVA tenderness, left CVA tenderness, guarding or rebound. Musculoskeletal:         General: No tenderness. Cervical back: Normal range of motion and neck supple. No rigidity or spasms. Thoracic back: No spasms. Normal range of motion. Lumbar back: No spasms. Normal range of motion. Lymphadenopathy:      Cervical: No cervical adenopathy. Skin:     Coloration: Skin is not jaundiced or pale. Findings: No bruising, erythema or rash. Neurological:      General: No focal deficit present. Mental Status: He is alert and oriented to person, place, and time. Cranial Nerves: No cranial nerve deficit. Sensory: No sensory deficit. Motor: No weakness or tremor. Coordination: Coordination normal.      Gait: Gait and tandem walk normal.      Deep Tendon Reflexes: Reflexes are normal and symmetric. Psychiatric:         Attention and Perception: Attention and perception normal. He is attentive. Mood and Affect: Mood is anxious. Mood is not depressed. Affect is not tearful. Speech: He is communicative. Speech is not rapid and pressured, delayed or slurred. Behavior: Behavior normal. Behavior is not agitated or slowed. Thought Content: Thought content normal.         Cognition and Memory: Cognition is not impaired. Judgment: Judgment normal.           ASSESSMENT AND PLAN      1. Sensation of lump in throat  Will refer to ENT for laryngoscopy. - Bisi Chan MD, Otolaryngology, 1967 [facility]    2. Difficulty breathing  Will evaluate for sleep study.  - Baseline Diagnostic Sleep Study;  Future  - CBC with Auto Differential; Future  - Comprehensive Metabolic Panel; Future    3. Snoring  Chronic problem we will evaluate for sleep study  - Baseline Diagnostic Sleep Study; Future  - CBC with Auto Differential; Future    4. Mixed hyperlipidemia  Repeat lipid panel  - Lipid, Fasting; Future  - TSH with Reflex; Future    5. Encounter for immunization  Patient refused vaccines    6. Encounter for screening for other viral diseases    - Hepatitis C Antibody; Future    7. Screening for diabetes mellitus (DM)    - Hemoglobin A1C; Future      Orders Placed This Encounter   Procedures    Lipid, Fasting     Standing Status:   Future     Standing Expiration Date:   12/5/2023    Hepatitis C Antibody     Standing Status:   Future     Standing Expiration Date:   12/5/2023    CBC with Auto Differential     Standing Status:   Future     Standing Expiration Date:   12/7/2023    Comprehensive Metabolic Panel     Fasting 8 hrs     Standing Status:   Future     Standing Expiration Date:   12/6/2023    TSH with Reflex     Standing Status:   Future     Standing Expiration Date:   12/6/2023    Hemoglobin A1C     Standing Status:   Future     Standing Expiration Date:   12/6/2023    AFL - Yoel Hagen MD, Otolaryngology, [facility]     Referral Priority:   Routine     Referral Type:   Eval and Treat     Referral Reason:   Specialty Services Required     Referred to Provider:   Sally Gutierres MD     Requested Specialty:   Otolaryngology     Number of Visits Requested:   1    Baseline Diagnostic Sleep Study     Standing Status:   Future     Standing Expiration Date:   6/6/2024     Order Specific Question:   Adult or Pediatric     Answer:   Adult Study (>7 Years)     Order Specific Question:   Location For Sleep Study     Answer: [facility] Specific Question:   Select Sleep Lab Location     Answer:   [facility]     Order Specific Question:   Pre-Study Patient Questions: Answer:   Snores loudly during sleep     Order Specific Question:   Pre-Study Patient Questions:      Answer: Reports choking or gasping for breath during sleep     Order Specific Question:   Pre-Study Patient Questions: Answer: Tosses and Turns all night or describes their sleep as restless         Medications Discontinued During This Encounter   Medication Reason    pravastatin (PRAVACHOL) 20 MG tablet Therapy completed       Marli Moulton received counseling on the following healthy behaviors: nutrition, exercise, and medication adherence  Reviewed prior labs and health maintenance  Continue current medications, diet and exercise. Discussed use, benefit, and side effects of prescribed medications. Barriers to medication compliance addressed. Patient given educational materials - see patient instructions  Was a self-tracking handout given in paper form or via [employer]? Yes    Requested Prescriptions      No prescriptions requested or ordered in this encounter       All patient questions answered. Patient voiced understanding. Quality Measures    Body mass index is 26.85 kg/m². Elevated. Weight control planned discussed daily exercise regimen and Healthy diet and regular exercise. BP: 114/80 Blood pressure is Normal. Treatment plan consists of Weight Reduction, DASH Eating Plan, Dietary Sodium Restriction, and No treatment change needed. Lab Results   Component Value Date    LDLCHOLESTEROL 131 (H) 10/15/2020    (goal LDL reduction with dx if diabetes is 50% LDL reduction)      PHQ Scores 12/6/2022 10/15/2020 8/15/2019   PHQ2 Score 0 0 0   PHQ9 Score 0 0 0     Interpretation of Total Score Depression Severity: 1-4 = Minimal depression, 5-9 = Mild depression, 10-14 = Moderate depression, 15-19 = Moderately severe depression, 20-27 = Severe depression    The patient'spast medical, surgical, social, and family history as well as his   current medications and allergies were reviewed as documented in today's encounter.       Medications, labs, diagnostic studies, consultations andfollow-up as documented in this encounter. Return for annual exam 30min. Patient wasseen with total face to face time of 30 minutes. More than 50% of this visit was counseling and education. Future Appointments   Date Time Provider Lyn Lemus   3/15/2023 10:30 AM Kathia Galeas MD [email or web address]     This note was completed by using the assistance of a speech-recognition program. However, inadvertent computerized transcription errors may be present. Althoughevery effort was made to ensure accuracy, no guarantees can be provided that every mistake has been identified and corrected by editing.   Electronically signed by Elena Bauer MD on 12/6/2022  1:51 PM

## 2022-12-06 NOTE — PATIENT INSTRUCTIONS
New Updates for [facility] MyChart/ [employer] ([facility]) JAJA    Thank you for choosing [facility] to give you the best care! [facility] ([facility]) is always trying to think of new ways to help their patients. We are asking all patients to try out the new digital registration that is now available through your [facility] account or the new JAJA, [employer] ([facility]). Via the jaja you're now able to update your personal and registration information prior to your upcoming appointment. This will save you time once you arrive at the office to check-in, not to mention your information remains safe!! Many other perks come from signing up for an account, such as:  Requesting refills  Scheduling an appointment  Completing an E-Visit  Sending a message to the office/provider  Having access to your medication list  Paying your bill/copay prior to your appointment  Scheduling your yearly mammogram  Review your test results    If you are not familiar with [facility] or the [employer] ([facility]) JAJA, please ask one of us and we will be happy to answer any questions or help you set-up your account.       Your [facility] office,  [place]

## 2022-12-06 NOTE — PROGRESS NOTES
Visit Information    Have you changed or started any medications since your last visit including any over-the-counter medicines, vitamins, or herbal medicines? no   Have you stopped taking any of your medications? Is so, why? -  yes -   Are you having any side effects from any of your medications? - no    Have you seen any other physician or provider since your last visit?  no   Have you had any other diagnostic tests since your last visit?  no   Have you been seen in the emergency room and/or had an admission in a hospital since we last saw you?  no   Have you had your routine dental cleaning in the past 6 months?  no     Do you have an active MyChart account? If no, what is the barrier?   Yes    Patient Care Team:  ANTONIETTA Carrera CNP as PCP - General (Family Medicine)  ANTONIETTA Carrera CNP as PCP [facility]    Medical History Review  Past Medical, Family, and Social History reviewed and does contribute to the patient presenting condition    Health Maintenance   Topic Date Due    COVID-19 Vaccine (1) Never done    Depression Screen  Never done    Hepatitis C screen  Never done    Lipids  10/15/2021    Flu vaccine (1) Never done    DTaP/Tdap/Td vaccine (2 - Td or Tdap) 10/15/2030    HIV screen  Completed    Hepatitis A vaccine  Aged Out    Hib vaccine  Aged Out    Meningococcal (ACWY) vaccine  Aged Out    Pneumococcal 0-64 years Vaccine  Aged Out    Varicella vaccine  Discontinued

## 2023-04-03 ENCOUNTER — HOSPITAL ENCOUNTER (OUTPATIENT)
Age: 38
Setting detail: SPECIMEN
Discharge: HOME | End: 2023-04-03
Payer: COMMERCIAL

## 2023-04-03 DIAGNOSIS — R06.89 DIFFICULTY BREATHING: ICD-10-CM

## 2023-04-03 DIAGNOSIS — R06.83 SNORING: ICD-10-CM

## 2023-04-03 DIAGNOSIS — Z11.59 ENCOUNTER FOR SCREENING FOR OTHER VIRAL DISEASES: ICD-10-CM

## 2023-04-03 DIAGNOSIS — Z13.1 SCREENING FOR DIABETES MELLITUS (DM): ICD-10-CM

## 2023-04-03 DIAGNOSIS — E78.2 MIXED HYPERLIPIDEMIA: ICD-10-CM

## 2023-04-03 LAB
ABSOLUTE EOS #: 0.2 K/UL (ref 0–0.4)
ABSOLUTE LYMPH #: 2.4 K/UL (ref 1–4.8)
ABSOLUTE MONO #: 0.6 K/UL (ref 0.1–1.3)
ALBUMIN SERPL-MCNC: 4.6 G/DL (ref 3.5–5.2)
ALP SERPL-CCNC: 137 U/L (ref 40–129)
ALT SERPL-CCNC: 45 U/L (ref 5–41)
ANION GAP SERPL CALCULATED.3IONS-SCNC: 9 MMOL/L (ref 9–17)
AST SERPL-CCNC: 41 U/L
BASOPHILS # BLD: 1 % (ref 0–2)
BASOPHILS ABSOLUTE: 0 K/UL (ref 0–0.2)
BILIRUB SERPL-MCNC: 0.5 MG/DL (ref 0.3–1.2)
BUN SERPL-MCNC: 7 MG/DL (ref 6–20)
CALCIUM SERPL-MCNC: 9.5 MG/DL (ref 8.6–10.4)
CHLORIDE SERPL-SCNC: 101 MMOL/L (ref 98–107)
CHOLEST SERPL-MCNC: 241 MG/DL
CHOLESTEROL/HDL RATIO: 5.6
CO2 SERPL-SCNC: 29 MMOL/L (ref 20–31)
CREAT SERPL-MCNC: 1.08 MG/DL (ref 0.7–1.2)
EOSINOPHILS RELATIVE PERCENT: 3 % (ref 0–4)
GFR SERPL CREATININE-BSD FRML MDRD: >60 ML/MIN/1.73M2
GLUCOSE SERPL-MCNC: 76 MG/DL (ref 70–99)
HCT VFR BLD AUTO: 45.2 % (ref 41–53)
HDLC SERPL-MCNC: 43 MG/DL
HGB BLD-MCNC: 15.6 G/DL (ref 13.5–17.5)
LDLC SERPL CALC-MCNC: 140 MG/DL (ref 0–130)
LYMPHOCYTES # BLD: 41 % (ref 24–44)
MCH RBC QN AUTO: 31.5 PG (ref 26–34)
MCHC RBC AUTO-ENTMCNC: 34.4 G/DL (ref 31–37)
MCV RBC AUTO: 91.6 FL (ref 80–100)
MONOCYTES # BLD: 10 % (ref 1–7)
PDW BLD-RTO: 13.7 % (ref 11.5–14.9)
PLATELET # BLD AUTO: 202 K/UL (ref 150–450)
PMV BLD AUTO: 9.7 FL (ref 6–12)
POTASSIUM SERPL-SCNC: 3.9 MMOL/L (ref 3.7–5.3)
PROT SERPL-MCNC: 7.8 G/DL (ref 6.4–8.3)
RBC # BLD: 4.94 M/UL (ref 4.5–5.9)
SEG NEUTROPHILS: 45 % (ref 36–66)
SEGMENTED NEUTROPHILS ABSOLUTE COUNT: 2.7 K/UL (ref 1.3–9.1)
SODIUM SERPL-SCNC: 139 MMOL/L (ref 135–144)
TRIGL SERPL-MCNC: 291 MG/DL
TSH SERPL-ACNC: 1.23 UIU/ML (ref 0.3–5)
WBC # BLD AUTO: 5.9 K/UL (ref 3.5–11)

## 2023-04-03 PROCEDURE — 85025 COMPLETE CBC W/AUTO DIFF WBC: CPT

## 2023-04-03 PROCEDURE — 80053 COMPREHEN METABOLIC PANEL: CPT

## 2023-04-03 PROCEDURE — 36415 COLL VENOUS BLD VENIPUNCTURE: CPT

## 2023-04-03 PROCEDURE — 86803 HEPATITIS C AB TEST: CPT

## 2023-04-03 PROCEDURE — 83036 HEMOGLOBIN GLYCOSYLATED A1C: CPT

## 2023-04-03 PROCEDURE — 80061 LIPID PANEL: CPT

## 2023-04-03 PROCEDURE — 84443 ASSAY THYROID STIM HORMONE: CPT

## 2023-04-04 LAB
EST. AVERAGE GLUCOSE BLD GHB EST-MCNC: 100 MG/DL
HBA1C MFR BLD: 5.1 % (ref 4–6)
HCV AB SER QL: NONREACTIVE

## 2023-04-05 NOTE — RESULT ENCOUNTER NOTE
Please notify patient: If patient is missing appointment tomorrow let him know high cholesterol worsening, he should work on improving his diet, and then recheck in 6 months, if lipids still high then will start a statin at that time  Worsening liver function tests needs ultrasound of the liver and further testing for abnormal liver testing    Otherwise labs within normal limits    Low carb, low fat diet, increase fruits and vegetables, and exercise 4-5 times a week 30-40 minutes a day, or walk 1-2 hours per day, or wear a pedometer and get at least 10,000 steps per day.     Future Appointments  4/6/2023   9:30 AM    Bonifacio Alba MD     [facility]               [id number]

## 2023-04-06 ENCOUNTER — OFFICE VISIT (OUTPATIENT)
Dept: FAMILY MEDICINE CLINIC | Age: 38
End: 2023-04-06
Payer: COMMERCIAL

## 2023-04-06 VITALS
HEIGHT: 68 IN | OXYGEN SATURATION: 96 % | DIASTOLIC BLOOD PRESSURE: 82 MMHG | TEMPERATURE: 97.8 F | HEART RATE: 75 BPM | SYSTOLIC BLOOD PRESSURE: 116 MMHG | WEIGHT: 176.8 LBS | BODY MASS INDEX: 26.8 KG/M2

## 2023-04-06 DIAGNOSIS — R10.13 DYSPEPSIA: ICD-10-CM

## 2023-04-06 DIAGNOSIS — J41.1 MUCOPURULENT CHRONIC BRONCHITIS (HCC): Primary | ICD-10-CM

## 2023-04-06 DIAGNOSIS — J05.10 ACUTE BACTERIAL EPIGLOTTITIS: ICD-10-CM

## 2023-04-06 DIAGNOSIS — L30.9 DERMATITIS: ICD-10-CM

## 2023-04-06 DIAGNOSIS — Z86.010 HISTORY OF COLON POLYPS: ICD-10-CM

## 2023-04-06 DIAGNOSIS — K75.2 NONSPECIFIC REACTIVE HEPATITIS: ICD-10-CM

## 2023-04-06 DIAGNOSIS — B96.89 ACUTE BACTERIAL EPIGLOTTITIS: ICD-10-CM

## 2023-04-06 DIAGNOSIS — E78.2 MIXED HYPERLIPIDEMIA: ICD-10-CM

## 2023-04-06 DIAGNOSIS — N48.9 LESION OF PENIS: ICD-10-CM

## 2023-04-06 PROBLEM — Z86.0100 HISTORY OF COLON POLYPS: Status: ACTIVE | Noted: 2023-04-06

## 2023-04-06 PROCEDURE — 99214 OFFICE O/P EST MOD 30 MIN: CPT | Performed by: FAMILY MEDICINE

## 2023-04-06 RX ORDER — METHYLPREDNISOLONE 4 MG/1
TABLET ORAL
Qty: 1 KIT | Refills: 0 | Status: SHIPPED | OUTPATIENT
Start: 2023-04-06

## 2023-04-06 RX ORDER — CLOTRIMAZOLE AND BETAMETHASONE DIPROPIONATE 10; .64 MG/G; MG/G
CREAM TOPICAL
Qty: 45 G | Refills: 0 | Status: SHIPPED | OUTPATIENT
Start: 2023-04-06

## 2023-04-06 RX ORDER — AZITHROMYCIN 250 MG/1
TABLET, FILM COATED ORAL
Qty: 6 TABLET | Refills: 0 | Status: SHIPPED | OUTPATIENT
Start: 2023-04-06 | End: 2023-04-11

## 2023-04-06 SDOH — HEALTH STABILITY: PHYSICAL HEALTH: ON AVERAGE, HOW MANY DAYS PER WEEK DO YOU ENGAGE IN MODERATE TO STRENUOUS EXERCISE (LIKE A BRISK WALK)?: 3 DAYS

## 2023-04-06 SDOH — ECONOMIC STABILITY: INCOME INSECURITY: HOW HARD IS IT FOR YOU TO PAY FOR THE VERY BASICS LIKE FOOD, HOUSING, MEDICAL CARE, AND HEATING?: NOT HARD AT ALL

## 2023-04-06 SDOH — HEALTH STABILITY: PHYSICAL HEALTH: ON AVERAGE, HOW MANY MINUTES DO YOU ENGAGE IN EXERCISE AT THIS LEVEL?: 20 MIN

## 2023-04-06 SDOH — ECONOMIC STABILITY: FOOD INSECURITY: WITHIN THE PAST 12 MONTHS, YOU WORRIED THAT YOUR FOOD WOULD RUN OUT BEFORE YOU GOT MONEY TO BUY MORE.: NEVER TRUE

## 2023-04-06 SDOH — ECONOMIC STABILITY: HOUSING INSECURITY
IN THE LAST 12 MONTHS, WAS THERE A TIME WHEN YOU DID NOT HAVE A STEADY PLACE TO SLEEP OR SLEPT IN A SHELTER (INCLUDING NOW)?: NO

## 2023-04-06 SDOH — ECONOMIC STABILITY: FOOD INSECURITY: WITHIN THE PAST 12 MONTHS, THE FOOD YOU BOUGHT JUST DIDN'T LAST AND YOU DIDN'T HAVE MONEY TO GET MORE.: NEVER TRUE

## 2023-04-06 ASSESSMENT — PATIENT HEALTH QUESTIONNAIRE - PHQ9
SUM OF ALL RESPONSES TO PHQ QUESTIONS 1-9: 0
SUM OF ALL RESPONSES TO PHQ QUESTIONS 1-9: 0
SUM OF ALL RESPONSES TO PHQ9 QUESTIONS 1 & 2: 0
SUM OF ALL RESPONSES TO PHQ QUESTIONS 1-9: 0
2. FEELING DOWN, DEPRESSED OR HOPELESS: 0
SUM OF ALL RESPONSES TO PHQ QUESTIONS 1-9: 0
1. LITTLE INTEREST OR PLEASURE IN DOING THINGS: 0

## 2023-04-06 ASSESSMENT — ENCOUNTER SYMPTOMS
SINUS PAIN: 0
CHEST TIGHTNESS: 0
COUGH: 1
NAUSEA: 0
WHEEZING: 0
RHINORRHEA: 1
CONSTIPATION: 0
SINUS PRESSURE: 0
ABDOMINAL PAIN: 0
SHORTNESS OF BREATH: 0
ABDOMINAL DISTENTION: 0
VOMITING: 0
DIARRHEA: 0

## 2023-04-06 NOTE — PROGRESS NOTES
Visit Information    Have you changed or started any medications since your last visit including any over-the-counter medicines, vitamins, or herbal medicines? no   Have you stopped taking any of your medications? Is so, why? -  no  Are you having any side effects from any of your medications? - no    Have you seen any other physician or provider since your last visit?  no   Have you had any other diagnostic tests since your last visit?  no   Have you been seen in the emergency room and/or had an admission in a hospital since we last saw you?  no   Have you had your routine dental cleaning in the past 6 months?  yes -      Do you have an active MyChart account? If no, what is the barrier?   Yes    Patient Care Team:  Chris Case MD as PCP - General (Family Medicine)  Roderick Miller MD as PCP - Empaneled Provider    Medical History Review  Past Medical, Family, and Social History reviewed and does contribute to the patient presenting condition    Health Maintenance   Topic Date Due    COVID-19 Vaccine (1) Never done    Flu vaccine (Season Ended) 08/01/2023    Depression Screen  12/06/2023    DTaP/Tdap/Td vaccine (2 - Td or Tdap) 10/15/2030    Hepatitis C screen  Completed    HIV screen  Completed    Hepatitis A vaccine  Aged Out    Hib vaccine  Aged Out    Meningococcal (ACWY) vaccine  Aged Out    Pneumococcal 0-64 years Vaccine  Aged Out    Lipids  Discontinued    Varicella vaccine  Discontinued    Diabetes screen  Discontinued
Rinku Abel MD, Urology, [place]  5. Nonspecific reactive hepatitis  We will do ultrasound of the liver, recent hepatitis C screening was negative  We will refer to GI  -     US LIVER; Future  -     Eleno Quick MD, Gastroenterology, [place]  6. Mixed hyperlipidemia  Ongoing  he was previously on pravastatin  Low carb, low fat diet, increase fruits and vegetables, and exercise 4-5 times a week 30-40 minutes a day, or walk 1-2 hours per day, or wear a pedometer and get at least 10,000 steps per day. 7. History of colon polyps  Prior adenomatous polyp on 1/18/2017, needs recall colonoscopy  -     Eleno Quick MD, Gastroenterology, [place]  8. Dermatitis right leg  Failing to resolve  -    trial of clotrimazole-betamethasone (LOTRISONE) 1-0.05 % cream; Apply topically 2 times daily x 4 weeks, Disp-45 g, R-0, Normal    Choco received counseling on the following healthy behaviors: nutrition, exercise, medication adherence, and weight loss  Reviewed prior labs and health maintenance  Discussed use, benefit, and side effects of prescribed medications. Barriers to medication compliance addressed. Patient given educational materials - see patient instructions  All patient questions answered. Patient voiced understanding. The patient's past medical,surgical, social, and family history as well as his current medications and allergies were reviewed as documented in today's encounter. Medications, labs, diagnostic studies, consultations and follow-up as documented in this encounter. Return in about 3 months (around 7/6/2023) for Visit type PHYSICAL, VISION screen, PHQ9. Grisel Bowen    Future Appointments   Date Time Provider Lyn Lemus   4/17/2023  5:00 PM [facility] ULTRASOUND RM [address] [facility]   4/18/2023 10:00 AM Zelda Tatum MD [facility]   7/27/2023 11:30 AM Yvonne Reyes MD [facility]           Prior to Visit Medications    Medication Sig Taking?  Authorizing

## 2023-05-02 ENCOUNTER — OFFICE VISIT (OUTPATIENT)
Dept: UROLOGY | Age: 38
End: 2023-05-02
Payer: COMMERCIAL

## 2023-05-02 VITALS — HEIGHT: 68 IN | BODY MASS INDEX: 26.88 KG/M2 | TEMPERATURE: 98 F

## 2023-05-02 DIAGNOSIS — N48.9 PENILE LESION: Primary | ICD-10-CM

## 2023-05-02 PROCEDURE — 99203 OFFICE O/P NEW LOW 30 MIN: CPT | Performed by: UROLOGY

## 2023-05-02 ASSESSMENT — ENCOUNTER SYMPTOMS
SHORTNESS OF BREATH: 0
BACK PAIN: 0
VOMITING: 0
EYE PAIN: 0
COUGH: 0
ABDOMINAL PAIN: 0
DIARRHEA: 0
CONSTIPATION: 0
WHEEZING: 0
EYE REDNESS: 0
NAUSEA: 0

## 2023-05-02 NOTE — PROGRESS NOTES
[address] [address]  Dept: [phone]  Dept Fax: [phone] [facility] Urology Office Note - New patient    Patient:  Yvonne Easley  YOB: 1985  Date: 5/2/2023    The patient is a 30 y.o. male who presents todayfor evaluation of the following problems:   Chief Complaint   Patient presents with    Lesion(s)     Penile, Urgent Care notes in media     referred by Hay Edmonds MD.      HPI  Here for a lesion on the penis. He had sex with his wife, which had some chaffing. He noticed a sore after that time. It has resolved, and now looks a bit discolored. (Patient's old records have been requested, reviewed and summarized in today's note.)    Summary of old records: N/A    Additional History: N/A    Procedures Today: N/A    Last several PSA's:  No results found for: PSA  Last total testosterone:  No results found for: TESTOSTERONE  Urinalysis today:  No results found for this visit on 05/02/23. AUA Symptom Score (5/2/2023): Last BUN and creatinine:  Lab Results   Component Value Date    BUN 7 04/03/2023     Lab Results   Component Value Date    CREATININE 1.08 04/03/2023       Additional Lab/Culture results: none    Imaging Reviewed during this Office Visit: none  (results were independently reviewed by physician and radiology report verified)    PAST MEDICAL, FAMILY AND SOCIAL HISTORY:  No past medical history on file.   Past Surgical History:   Procedure Laterality Date    COLONOSCOPY W/ BIOPSIES  2017    Dr. Yamile Campbell     Family History   Problem Relation Age of Onset    Cardiomyopathy Sister         postpartum cardiomyopathy     Outpatient Medications Marked as Taking for the 5/2/23 encounter (Office Visit) with Karena Luther MD   Medication Sig Dispense Refill    clotrimazole-betamethasone (LOTRISONE) 1-0.05 % cream Apply topically 2 times

## 2023-11-16 ENCOUNTER — TELEPHONE (OUTPATIENT)
Dept: FAMILY MEDICINE CLINIC | Age: 38
End: 2023-11-16

## 2023-11-17 ENCOUNTER — OFFICE VISIT (OUTPATIENT)
Dept: FAMILY MEDICINE CLINIC | Age: 38
End: 2023-11-17
Payer: COMMERCIAL

## 2023-11-17 VITALS
DIASTOLIC BLOOD PRESSURE: 100 MMHG | TEMPERATURE: 97.4 F | OXYGEN SATURATION: 98 % | SYSTOLIC BLOOD PRESSURE: 142 MMHG | WEIGHT: 177.6 LBS | HEIGHT: 68 IN | HEART RATE: 86 BPM | BODY MASS INDEX: 26.92 KG/M2

## 2023-11-17 DIAGNOSIS — I10 PRIMARY HYPERTENSION: Primary | ICD-10-CM

## 2023-11-17 DIAGNOSIS — E78.5 HYPERLIPIDEMIA WITH TARGET LDL LESS THAN 100: ICD-10-CM

## 2023-11-17 DIAGNOSIS — M54.2 NECK PAIN: ICD-10-CM

## 2023-11-17 DIAGNOSIS — G43.109 COMPLICATED MIGRAINE: ICD-10-CM

## 2023-11-17 PROBLEM — R06.89 DIFFICULTY BREATHING: Status: RESOLVED | Noted: 2022-12-06 | Resolved: 2023-11-17

## 2023-11-17 PROBLEM — L30.9 DERMATITIS: Status: RESOLVED | Noted: 2023-04-06 | Resolved: 2023-11-17

## 2023-11-17 PROBLEM — J41.1 MUCOPURULENT CHRONIC BRONCHITIS (HCC): Status: RESOLVED | Noted: 2023-04-06 | Resolved: 2023-11-17

## 2023-11-17 PROBLEM — D12.4 ADENOMATOUS POLYP OF DESCENDING COLON: Status: RESOLVED | Noted: 2019-12-09 | Resolved: 2023-11-17

## 2023-11-17 PROBLEM — R09.A2 SENSATION OF LUMP IN THROAT: Status: RESOLVED | Noted: 2022-12-06 | Resolved: 2023-11-17

## 2023-11-17 PROBLEM — R10.13 DYSPEPSIA: Status: RESOLVED | Noted: 2023-04-06 | Resolved: 2023-11-17

## 2023-11-17 PROCEDURE — 99214 OFFICE O/P EST MOD 30 MIN: CPT | Performed by: FAMILY MEDICINE

## 2023-11-17 PROCEDURE — 3080F DIAST BP >= 90 MM HG: CPT | Performed by: FAMILY MEDICINE

## 2023-11-17 PROCEDURE — 3077F SYST BP >= 140 MM HG: CPT | Performed by: FAMILY MEDICINE

## 2023-11-17 RX ORDER — AMLODIPINE BESYLATE 5 MG/1
5 TABLET ORAL DAILY
Qty: 90 TABLET | Refills: 1 | Status: SHIPPED | OUTPATIENT
Start: 2023-11-17

## 2023-11-17 RX ORDER — AMLODIPINE BESYLATE 2.5 MG/1
TABLET ORAL
COMMUNITY
Start: 2023-11-15 | End: 2023-11-17 | Stop reason: ALTCHOICE

## 2023-11-17 RX ORDER — ATORVASTATIN CALCIUM 10 MG/1
10 TABLET, FILM COATED ORAL
Qty: 90 TABLET | Refills: 3 | Status: SHIPPED | OUTPATIENT
Start: 2023-11-17

## 2023-11-17 RX ORDER — POLYMYXIN B SULFATE AND TRIMETHOPRIM 1; 10000 MG/ML; [USP'U]/ML
SOLUTION OPHTHALMIC
COMMUNITY
Start: 2023-11-06 | End: 2023-11-17 | Stop reason: ALTCHOICE

## 2023-11-17 RX ORDER — ONDANSETRON 4 MG/1
4 TABLET, FILM COATED ORAL EVERY 6 HOURS PRN
Qty: 24 TABLET | Refills: 0 | Status: SHIPPED | OUTPATIENT
Start: 2023-11-17 | End: 2023-11-23

## 2023-11-17 RX ORDER — SUMATRIPTAN 50 MG/1
50 TABLET, FILM COATED ORAL DAILY PRN
Qty: 9 TABLET | Refills: 0 | Status: SHIPPED | OUTPATIENT
Start: 2023-11-17

## 2023-11-17 SDOH — ECONOMIC STABILITY: INCOME INSECURITY: HOW HARD IS IT FOR YOU TO PAY FOR THE VERY BASICS LIKE FOOD, HOUSING, MEDICAL CARE, AND HEATING?: NOT HARD AT ALL

## 2023-11-17 SDOH — ECONOMIC STABILITY: FOOD INSECURITY: WITHIN THE PAST 12 MONTHS, THE FOOD YOU BOUGHT JUST DIDN'T LAST AND YOU DIDN'T HAVE MONEY TO GET MORE.: NEVER TRUE

## 2023-11-17 SDOH — ECONOMIC STABILITY: FOOD INSECURITY: WITHIN THE PAST 12 MONTHS, YOU WORRIED THAT YOUR FOOD WOULD RUN OUT BEFORE YOU GOT MONEY TO BUY MORE.: NEVER TRUE

## 2023-11-17 ASSESSMENT — ENCOUNTER SYMPTOMS
DIARRHEA: 0
NAUSEA: 0
SHORTNESS OF BREATH: 0
VOMITING: 0
ABDOMINAL PAIN: 0
CHEST TIGHTNESS: 0
ABDOMINAL DISTENTION: 0
CONSTIPATION: 0
WHEEZING: 0
COUGH: 0

## 2023-11-17 ASSESSMENT — PATIENT HEALTH QUESTIONNAIRE - PHQ9
1. LITTLE INTEREST OR PLEASURE IN DOING THINGS: 0
SUM OF ALL RESPONSES TO PHQ QUESTIONS 1-9: 0
2. FEELING DOWN, DEPRESSED OR HOPELESS: 0
SUM OF ALL RESPONSES TO PHQ9 QUESTIONS 1 & 2: 0
SUM OF ALL RESPONSES TO PHQ QUESTIONS 1-9: 0

## 2023-11-17 NOTE — PROGRESS NOTES
Visit Information    Have you changed or started any medications since your last visit including any over-the-counter medicines, vitamins, or herbal medicines? no   Have you stopped taking any of your medications? Is so, why? -  no  Are you having any side effects from any of your medications? - no    Have you seen any other physician or provider since your last visit? yes -    Have you had any other diagnostic tests since your last visit? yes -    Have you been seen in the emergency room and/or had an admission in a hospital since we last saw you?  yes -    Have you had your routine dental cleaning in the past 6 months?  yes -      Do you have an active MyChart account? If no, what is the barrier?   Yes    Patient Care Team:  Oswaldo Palumbo MD as PCP - General (Family Medicine)  Oswaldo Paulmbo MD as PCP - Empaneled Provider  Liam Terrazas MD as Consulting Physician (Otolaryngology)  Malachi Ventura MD as Consulting Physician (Urology)    Medical History Review  Past Medical, Family, and Social History reviewed and does contribute to the patient presenting condition    Health Maintenance   Topic Date Due    Hepatitis B vaccine (1 of 3 - 3-dose series) Never done    COVID-19 Vaccine (1) Never done    Pneumococcal 0-64 years Vaccine (1 - PCV) Never done    Flu vaccine (1) Never done    Depression Screen  04/06/2024    DTaP/Tdap/Td vaccine (2 - Td or Tdap) 10/15/2030    Hepatitis C screen  Completed    HIV screen  Completed    Hepatitis A vaccine  Aged Out    Hib vaccine  Aged Out    HPV vaccine  Aged Out    Meningococcal (ACWY) vaccine  Aged Out    Lipids  Discontinued    Varicella vaccine  Discontinued    Diabetes screen  Discontinued
5.9 04/03/2023    HGB 15.6 04/03/2023    HCT 45.2 04/03/2023    MCV 91.6 04/03/2023     04/03/2023       Lab Results   Component Value Date/Time     04/03/2023 01:05 PM    K 3.9 04/03/2023 01:05 PM     04/03/2023 01:05 PM    CO2 29 04/03/2023 01:05 PM    BUN 7 04/03/2023 01:05 PM    CREATININE 1.08 04/03/2023 01:05 PM    GLUCOSE 76 04/03/2023 01:05 PM    CALCIUM 9.5 04/03/2023 01:05 PM        Lab Results   Component Value Date    ALT 45 (H) 04/03/2023    AST 41 (H) 04/03/2023    ALKPHOS 137 (H) 04/03/2023    BILITOT 0.5 04/03/2023       Lab Results   Component Value Date    TSH 1.23 04/03/2023       Lab Results   Component Value Date    CHOL 164 11/06/2014     Lab Results   Component Value Date    TRIG 96 11/06/2014     Lab Results   Component Value Date    HDL 43 04/03/2023    HDL 44 10/15/2020    HDL 48 11/06/2014     Lab Results   Component Value Date    LDLCHOLESTEROL 140 (H) 04/03/2023    LDLCHOLESTEROL 131 (H) 10/15/2020    LDLCHOLESTEROL 97 11/06/2014     Lab Results   Component Value Date    CHOLHDLRATIO 5.6 (H) 04/03/2023    CHOLHDLRATIO 4.7 10/15/2020    CHOLHDLRATIO 3.4 11/06/2014       No results found for: \"NSSASIQW28\"  No results found for: \"FOLATE\"  No results found for: \"VITD25\"      Orders Placed This Encounter   Medications    amLODIPine (NORVASC) 5 MG tablet     Sig: Take 1 tablet by mouth daily Goal BP bellow 130/80 and above 115/65, heart rate 56 to 90 bpm     Dispense:  90 tablet     Refill:  1    atorvastatin (LIPITOR) 10 MG tablet     Sig: Take 1 tablet by mouth daily (before dinner) For high cholesterol, to decrease the risk of stroke and heart attacks     Dispense:  90 tablet     Refill:  3    SUMAtriptan (IMITREX) 50 MG tablet     Sig: Take 1 tablet by mouth daily as needed for Migraine Can take additional dose in 2 hrs PRN.  Max 3 days/wk     Dispense:  9 tablet     Refill:  0    ondansetron (ZOFRAN) 4 MG tablet     Sig: Take 1 tablet by mouth every 6 hours as needed for

## 2023-11-17 NOTE — TELEPHONE ENCOUNTER
Noted  Future Appointments   Date Time Provider [address]   11/24/2023 11:30 AM SCHEDULE, ARELI Reddy   4/3/2024  1:00 PM MD eri Duong

## 2024-01-02 ENCOUNTER — TELEPHONE (OUTPATIENT)
Dept: NEUROLOGY | Age: 39
End: 2024-01-02

## 2024-01-02 NOTE — TELEPHONE ENCOUNTER
01 02 2024 called the patient times 3 (12 09 2023 and 12 16 2023 and 01 02 2024 at   [phone]) to schedule new patient appointment with one of our providers, DECLAN all 3 times, no response.  I mailed the patient a letter asking them to call the office back to schedule this appointment.  KS

## 2024-02-21 ENCOUNTER — TELEPHONE (OUTPATIENT)
Dept: GASTROENTEROLOGY | Age: 39
End: 2024-02-21

## 2024-02-21 NOTE — TELEPHONE ENCOUNTER
1st attempt writer left voicemai to schedule new patient appointment.  2nd attempt letter sent to address in chart.

## 2024-04-01 ASSESSMENT — PATIENT HEALTH QUESTIONNAIRE - PHQ9
SUM OF ALL RESPONSES TO PHQ9 QUESTIONS 1 & 2: 0
SUM OF ALL RESPONSES TO PHQ QUESTIONS 1-9: 0
2. FEELING DOWN, DEPRESSED OR HOPELESS: NOT AT ALL
SUM OF ALL RESPONSES TO PHQ QUESTIONS 1-9: 0
1. LITTLE INTEREST OR PLEASURE IN DOING THINGS: NOT AT ALL
SUM OF ALL RESPONSES TO PHQ QUESTIONS 1-9: 0
SUM OF ALL RESPONSES TO PHQ9 QUESTIONS 1 & 2: 0
2. FEELING DOWN, DEPRESSED OR HOPELESS: NOT AT ALL
SUM OF ALL RESPONSES TO PHQ QUESTIONS 1-9: 0
1. LITTLE INTEREST OR PLEASURE IN DOING THINGS: NOT AT ALL

## 2024-04-03 ENCOUNTER — OFFICE VISIT (OUTPATIENT)
Dept: FAMILY MEDICINE CLINIC | Age: 39
End: 2024-04-03
Payer: COMMERCIAL

## 2024-04-03 VITALS
WEIGHT: 178.8 LBS | HEART RATE: 98 BPM | BODY MASS INDEX: 26.48 KG/M2 | OXYGEN SATURATION: 96 % | SYSTOLIC BLOOD PRESSURE: 118 MMHG | DIASTOLIC BLOOD PRESSURE: 86 MMHG | TEMPERATURE: 98.7 F | HEIGHT: 69 IN

## 2024-04-03 DIAGNOSIS — E78.5 HYPERLIPIDEMIA WITH TARGET LDL LESS THAN 100: ICD-10-CM

## 2024-04-03 DIAGNOSIS — J30.89 SEASONAL ALLERGIC RHINITIS DUE TO OTHER ALLERGIC TRIGGER: ICD-10-CM

## 2024-04-03 DIAGNOSIS — J20.9 ACUTE BRONCHITIS DUE TO INFECTION: ICD-10-CM

## 2024-04-03 DIAGNOSIS — M62.838 NECK MUSCLE SPASM: ICD-10-CM

## 2024-04-03 DIAGNOSIS — Z11.59 ENCOUNTER FOR SCREENING FOR OTHER VIRAL DISEASES: ICD-10-CM

## 2024-04-03 DIAGNOSIS — I10 PRIMARY HYPERTENSION: ICD-10-CM

## 2024-04-03 DIAGNOSIS — J01.80 ACUTE NON-RECURRENT SINUSITIS OF OTHER SINUS: Primary | ICD-10-CM

## 2024-04-03 PROBLEM — K59.00 CONSTIPATION: Status: ACTIVE | Noted: 2024-04-03

## 2024-04-03 PROBLEM — K62.5 HEMORRHAGE OF RECTUM AND ANUS: Status: ACTIVE | Noted: 2024-04-03

## 2024-04-03 PROBLEM — R20.9 SKIN SENSATION DISTURBANCE: Status: ACTIVE | Noted: 2024-04-03

## 2024-04-03 PROCEDURE — 3074F SYST BP LT 130 MM HG: CPT | Performed by: FAMILY MEDICINE

## 2024-04-03 PROCEDURE — 99214 OFFICE O/P EST MOD 30 MIN: CPT | Performed by: FAMILY MEDICINE

## 2024-04-03 PROCEDURE — 3079F DIAST BP 80-89 MM HG: CPT | Performed by: FAMILY MEDICINE

## 2024-04-03 RX ORDER — BACLOFEN 10 MG/1
10 TABLET ORAL 3 TIMES DAILY PRN
Qty: 90 TABLET | Refills: 0 | Status: SHIPPED | OUTPATIENT
Start: 2024-04-03

## 2024-04-03 RX ORDER — LEVALBUTEROL TARTRATE 45 UG/1
2 AEROSOL, METERED ORAL EVERY 6 HOURS PRN
Qty: 15 G | Refills: 0 | Status: SHIPPED | OUTPATIENT
Start: 2024-04-03

## 2024-04-03 RX ORDER — FLUTICASONE PROPIONATE 50 MCG
2 SPRAY, SUSPENSION (ML) NASAL DAILY
Qty: 16 G | Refills: 0 | Status: SHIPPED | OUTPATIENT
Start: 2024-04-03

## 2024-04-03 RX ORDER — AZITHROMYCIN 250 MG/1
TABLET, FILM COATED ORAL
Qty: 6 TABLET | Refills: 0 | Status: SHIPPED | OUTPATIENT
Start: 2024-04-03 | End: 2024-04-08

## 2024-04-03 RX ORDER — CETIRIZINE HYDROCHLORIDE 10 MG/1
10 TABLET ORAL
Qty: 90 TABLET | Refills: 0 | Status: SHIPPED | OUTPATIENT
Start: 2024-04-03

## 2024-04-03 SDOH — ECONOMIC STABILITY: FOOD INSECURITY: WITHIN THE PAST 12 MONTHS, YOU WORRIED THAT YOUR FOOD WOULD RUN OUT BEFORE YOU GOT MONEY TO BUY MORE.: NEVER TRUE

## 2024-04-03 SDOH — ECONOMIC STABILITY: FOOD INSECURITY: WITHIN THE PAST 12 MONTHS, THE FOOD YOU BOUGHT JUST DIDN'T LAST AND YOU DIDN'T HAVE MONEY TO GET MORE.: NEVER TRUE

## 2024-04-03 SDOH — ECONOMIC STABILITY: INCOME INSECURITY: HOW HARD IS IT FOR YOU TO PAY FOR THE VERY BASICS LIKE FOOD, HOUSING, MEDICAL CARE, AND HEATING?: NOT HARD AT ALL

## 2024-04-03 ASSESSMENT — ENCOUNTER SYMPTOMS
NAUSEA: 0
VOMITING: 0
CONSTIPATION: 0
SINUS PRESSURE: 1
SINUS PAIN: 1
CHEST TIGHTNESS: 0
ABDOMINAL PAIN: 0
ABDOMINAL DISTENTION: 0
WHEEZING: 0
RHINORRHEA: 1

## 2024-04-03 NOTE — PROGRESS NOTES
Visit Information    Have you changed or started any medications since your last visit including any over-the-counter medicines, vitamins, or herbal medicines? no   Have you stopped taking any of your medications? Is so, why? -  no  Are you having any side effects from any of your medications? - no    Have you seen any other physician or provider since your last visit?  no   Have you had any other diagnostic tests since your last visit?  yes -    Have you been seen in the emergency room and/or had an admission in a hospital since we last saw you?  yes -    Have you had your routine dental cleaning in the past 6 months?  no     Do you have an active MyChart account? If no, what is the barrier?  Yes    Patient Care Team:  Karla Ortega MD as PCP - General (Family Medicine)  Karla Ortega MD as PCP - Empaneled Provider  Jose Lu MD as Consulting Physician (Otolaryngology)  Berny Piedra MD as Consulting Physician (Urology)    Medical History Review  Past Medical, Family, and Social History reviewed and does contribute to the patient presenting condition    Health Maintenance   Topic Date Due    Hepatitis B vaccine (1 of 3 - 3-dose series) Never done    COVID-19 Vaccine (1) Never done    Pneumococcal 0-64 years Vaccine (1 of 2 - PCV) Never done    Lipids  04/03/2024    Flu vaccine (Season Ended) 08/01/2024    Depression Screen  04/01/2025    DTaP/Tdap/Td vaccine (2 - Td or Tdap) 10/15/2030    Hepatitis C screen  Completed    HIV screen  Completed    Hepatitis A vaccine  Aged Out    Hib vaccine  Aged Out    HPV vaccine  Aged Out    Polio vaccine  Aged Out    Meningococcal (ACWY) vaccine  Aged Out    Varicella vaccine  Discontinued    Diabetes screen  Discontinued             
    Lab Results   Component Value Date    CHOLHDLRATIO 5.6 (H) 2023    CHOLHDLRATIO 4.7 10/15/2020    CHOLHDLRATIO 3.4 2014       Lab Results   Component Value Date    LABA1C 5.1 2023       No results found for: \"ZVICUGGG18\"    No results found for: \"FOLATE\"    No results found for: \"VITD25\"      Orders Placed This Encounter   Procedures    CBC     Standing Status:   Future     Standing Expiration Date:   4/3/2025    Comprehensive Metabolic Panel     Standing Status:   Future     Standing Expiration Date:   4/3/2025    Urinalysis with Microscopic     Standing Status:   Future     Standing Expiration Date:   4/3/2025     Order Specific Question:   SPECIFY(EX-CATH,MIDSTREAM,CYSTO,ETC)?     Answer:   midstream    Uric Acid     Standing Status:   Future     Standing Expiration Date:   4/3/2025    TSH     Standing Status:   Future     Standing Expiration Date:   4/3/2025    Magnesium     Standing Status:   Future     Standing Expiration Date:   4/3/2025    Lipid Panel     Standing Status:   Future     Standing Expiration Date:   4/3/2025     Order Specific Question:   Is Patient Fasting?/# of Hours     Answer:   8-10 Hours, water ok to drink    Hepatitis B Surface Antibody     Standing Status:   Future     Standing Expiration Date:   4/3/2025       Orders Placed This Encounter   Medications    azithromycin (ZITHROMAX) 250 MG tablet     Si mg orally on day one followed by 250 mg daily on days two through five     Dispense:  6 tablet     Refill:  0    levalbuterol (XOPENEX HFA) 45 MCG/ACT inhaler     Sig: Inhale 2 puffs into the lungs every 6 hours as needed for Wheezing or Shortness of Breath (coughing spells)     Dispense:  15 g     Refill:  0    cetirizine (ZYRTEC ALLERGY) 10 MG tablet     Sig: Take 1 tablet by mouth Daily with supper     Dispense:  90 tablet     Refill:  0    fluticasone (FLONASE) 50 MCG/ACT nasal spray     Si sprays by Each Nostril route daily For 2 weeks     Dispense:  16

## 2024-04-07 PROBLEM — J30.9 ALLERGIC RHINITIS DUE TO ALLERGEN: Status: ACTIVE | Noted: 2024-04-07

## 2025-01-27 ENCOUNTER — TELEPHONE (OUTPATIENT)
Dept: FAMILY MEDICINE CLINIC | Age: 40
End: 2025-01-27

## 2025-01-27 NOTE — TELEPHONE ENCOUNTER
Noted. Thank you!  I highly suggest to go to urgent care, I also suggest to keep the appointment coming up    Urgent care   [facility] walk-in care   [address] [place] [address]     Phone #[phone] , on the side of prior DOLLY [address], across the empty parking lot.  Hours:  Monday through Friday 8 AM to 8 PM  Saturday and Sunday 10 AM to 4 PM.      Future Appointments   Date Time Provider Department Center   2/13/2025  9:15 AM Karla Ortega MD fp sc [facility]

## 2025-01-27 NOTE — TELEPHONE ENCOUNTER
Spoke to Choco about recommendations.    He understood and will keep upcoming appointment.    Thank you.

## 2025-01-27 NOTE — TELEPHONE ENCOUNTER
Choco called    Has a 2/13/25 appointment scheduled for throat concerns from an incident he was in over a year ago in 2023.    If possible he would like to know if anything could help him out before the appointment because he is dealing with uncomfortableness when swallowing and when sleeping?    Did advise about going to a Walk In,  or Hospital if pain or other concerns arise, but just wanted to have a message sent to Dr Ortega.     Please give him a call with advise.    Thank you.

## 2025-02-06 RX ORDER — AMOXICILLIN 500 MG/1
500 CAPSULE ORAL 2 TIMES DAILY
COMMUNITY
Start: 2025-01-31 | End: 2025-02-09

## 2025-02-10 SDOH — ECONOMIC STABILITY: FOOD INSECURITY: WITHIN THE PAST 12 MONTHS, THE FOOD YOU BOUGHT JUST DIDN'T LAST AND YOU DIDN'T HAVE MONEY TO GET MORE.: NEVER TRUE

## 2025-02-10 SDOH — ECONOMIC STABILITY: INCOME INSECURITY: IN THE LAST 12 MONTHS, WAS THERE A TIME WHEN YOU WERE NOT ABLE TO PAY THE MORTGAGE OR RENT ON TIME?: NO

## 2025-02-10 SDOH — ECONOMIC STABILITY: FOOD INSECURITY: WITHIN THE PAST 12 MONTHS, YOU WORRIED THAT YOUR FOOD WOULD RUN OUT BEFORE YOU GOT MONEY TO BUY MORE.: NEVER TRUE

## 2025-02-10 ASSESSMENT — PATIENT HEALTH QUESTIONNAIRE - PHQ9
1. LITTLE INTEREST OR PLEASURE IN DOING THINGS: NOT AT ALL
SUM OF ALL RESPONSES TO PHQ QUESTIONS 1-9: 0
1. LITTLE INTEREST OR PLEASURE IN DOING THINGS: NOT AT ALL
SUM OF ALL RESPONSES TO PHQ9 QUESTIONS 1 & 2: 0
SUM OF ALL RESPONSES TO PHQ QUESTIONS 1-9: 0
SUM OF ALL RESPONSES TO PHQ QUESTIONS 1-9: 0
2. FEELING DOWN, DEPRESSED OR HOPELESS: NOT AT ALL
SUM OF ALL RESPONSES TO PHQ9 QUESTIONS 1 & 2: 0
2. FEELING DOWN, DEPRESSED OR HOPELESS: NOT AT ALL
SUM OF ALL RESPONSES TO PHQ QUESTIONS 1-9: 0

## 2025-02-13 ENCOUNTER — TELEMEDICINE (OUTPATIENT)
Dept: FAMILY MEDICINE CLINIC | Age: 40
End: 2025-02-13

## 2025-02-13 DIAGNOSIS — R12 HEARTBURN: ICD-10-CM

## 2025-02-13 DIAGNOSIS — J01.80 ACUTE NON-RECURRENT SINUSITIS OF OTHER SINUS: Primary | ICD-10-CM

## 2025-02-13 DIAGNOSIS — Z11.59 ENCOUNTER FOR SCREENING FOR OTHER VIRAL DISEASES: ICD-10-CM

## 2025-02-13 DIAGNOSIS — R07.0 THROAT DISCOMFORT: ICD-10-CM

## 2025-02-13 DIAGNOSIS — J20.9 ACUTE BRONCHITIS DUE TO INFECTION: ICD-10-CM

## 2025-02-13 DIAGNOSIS — I10 PRIMARY HYPERTENSION: ICD-10-CM

## 2025-02-13 DIAGNOSIS — E78.5 HYPERLIPIDEMIA WITH TARGET LDL LESS THAN 100: ICD-10-CM

## 2025-02-13 RX ORDER — ALBUTEROL SULFATE 90 UG/1
2 INHALANT RESPIRATORY (INHALATION) EVERY 6 HOURS PRN
Qty: 8 G | Refills: 0 | Status: SHIPPED | OUTPATIENT
Start: 2025-02-13

## 2025-02-13 RX ORDER — AZITHROMYCIN 250 MG/1
TABLET, FILM COATED ORAL
Qty: 6 TABLET | Refills: 0 | Status: SHIPPED | OUTPATIENT
Start: 2025-02-13 | End: 2025-02-18

## 2025-02-13 RX ORDER — BENZONATATE 100 MG/1
100 CAPSULE ORAL 3 TIMES DAILY PRN
Qty: 21 CAPSULE | Refills: 0 | Status: SHIPPED | OUTPATIENT
Start: 2025-02-13 | End: 2025-02-20

## 2025-02-13 RX ORDER — OMEPRAZOLE 40 MG/1
40 CAPSULE, DELAYED RELEASE ORAL
Qty: 30 CAPSULE | Refills: 0 | Status: SHIPPED | OUTPATIENT
Start: 2025-02-13

## 2025-02-13 NOTE — ASSESSMENT & PLAN NOTE
Chronic, inadequately controlled, did not complete the prior labs    Continue Lipitor 10 Mg daily, low-carb diet, low-fat diet, exercise, check lipid panel  Lab Results   Component Value Date     (H) 04/03/2023       Orders:    Lipid Panel; Future

## 2025-02-13 NOTE — ASSESSMENT & PLAN NOTE
Chronic, ongoing, he did cut down on the spicy foods  Will rule out H. pylori, start Prilosec, if symptoms persist, will refer to GI  Orders:    H. Pylori Antigen, Stool; Future    omeprazole (PRILOSEC) 40 MG delayed release capsule; Take 1 capsule by mouth every morning (before breakfast)

## 2025-02-13 NOTE — ASSESSMENT & PLAN NOTE
Chronic, at goal, well-controlled  Continue medications, amlodipine 5 Mg daily and check labs  Did not complete the prior labs  Low-salt diet advised  Self-monitoring of blood pressure discussed  Orders:    CBC with Auto Differential; Future    Comprehensive Metabolic Panel; Future    Uric Acid; Future    Urinalysis with Microscopic; Future    TSH; Future    Magnesium; Future

## 2025-02-13 NOTE — PROGRESS NOTES
3-4 out of 10      Constitutional: [x] Appears well-developed and well-nourished [x] No apparent distress      [] Abnormal-       Mental status  [x] Alert and awake  [x] Oriented to person/place/time [x]Able to follow commands      Eyes:  EOM    [x]  Normal  [] Abnormal-  Sclera  [x]  Normal  [] Abnormal -         Discharge [x]  None visible  [] Abnormal -    HENT:   [x] Normocephalic, atraumatic.  [x] Abnormal- he shows me he has pain on the right submandibular area, also raspy voice      [x] Mouth/Throat: Mucous membranes are moist.     External Ears [x] Normal  [] Abnormal-     Neck: [x] No visualized mass     Pulmonary/Chest: [x] Respiratory effort normal.  [x] No visualized signs of difficulty breathing or respiratory distress        [x] Abnormal hacking cough during the encounter,       Musculoskeletal:   [x] Normal gait with no signs of ataxia         [x] Normal range of motion of neck        [] Abnormal-       Neurological:        [x] No Facial Asymmetry (Cranial nerve 7 motor function) (limited exam to video visit)          [x] No gaze palsy        [] Abnormal-            Skin:        [x] No significant exanthematous lesions or discoloration noted on facial skin         [] Abnormal-            Psychiatric:      [x] No Hallucinations       []Mood is normal      [x]Behavior is normal      [x]Judgment is normal      [x]Thought content is normal       [x] Abnormal-mildly anxious    Other pertinent observable physical exam findings-none    Due to this being a TeleHealth encounter, evaluation of the following organ systems is limited: Vitals/Constitutional/EENT/Resp/CV/GI//MS/Neuro/Skin/Heme-Lymph-Imm.         On this date 2/13/2025 I have spent 38 minutes reviewing previous notes, test results and face to face (virtual) with the patient discussing the diagnosis and importance of compliance with the treatment plan as well as documenting on the day of the visit.    --Karla Ortega MD     Electronically